# Patient Record
Sex: FEMALE | Race: WHITE | NOT HISPANIC OR LATINO | Employment: OTHER | URBAN - METROPOLITAN AREA
[De-identification: names, ages, dates, MRNs, and addresses within clinical notes are randomized per-mention and may not be internally consistent; named-entity substitution may affect disease eponyms.]

---

## 2018-10-14 ENCOUNTER — OFFICE VISIT (OUTPATIENT)
Dept: URGENT CARE | Facility: CLINIC | Age: 83
End: 2018-10-14
Payer: MEDICARE

## 2018-10-14 ENCOUNTER — HOSPITAL ENCOUNTER (OUTPATIENT)
Facility: MEDICAL CENTER | Age: 83
End: 2018-10-14
Attending: PHYSICIAN ASSISTANT
Payer: MEDICARE

## 2018-10-14 VITALS
BODY MASS INDEX: 21.98 KG/M2 | OXYGEN SATURATION: 92 % | RESPIRATION RATE: 16 BRPM | HEIGHT: 66 IN | SYSTOLIC BLOOD PRESSURE: 140 MMHG | TEMPERATURE: 60.8 F | HEART RATE: 92 BPM | DIASTOLIC BLOOD PRESSURE: 60 MMHG | WEIGHT: 136.8 LBS

## 2018-10-14 DIAGNOSIS — N30.90 CYSTITIS: ICD-10-CM

## 2018-10-14 LAB
APPEARANCE UR: NORMAL
BILIRUB UR STRIP-MCNC: NEGATIVE MG/DL
COLOR UR AUTO: YELLOW
GLUCOSE UR STRIP.AUTO-MCNC: NEGATIVE MG/DL
KETONES UR STRIP.AUTO-MCNC: NEGATIVE MG/DL
LEUKOCYTE ESTERASE UR QL STRIP.AUTO: NORMAL
NITRITE UR QL STRIP.AUTO: POSITIVE
PH UR STRIP.AUTO: 6.5 [PH] (ref 5–8)
PROT UR QL STRIP: NEGATIVE MG/DL
RBC UR QL AUTO: NEGATIVE
SP GR UR STRIP.AUTO: 1.01
UROBILINOGEN UR STRIP-MCNC: 0.2 MG/DL

## 2018-10-14 PROCEDURE — 99213 OFFICE O/P EST LOW 20 MIN: CPT | Performed by: PHYSICIAN ASSISTANT

## 2018-10-14 PROCEDURE — 87086 URINE CULTURE/COLONY COUNT: CPT

## 2018-10-14 PROCEDURE — 81002 URINALYSIS NONAUTO W/O SCOPE: CPT | Performed by: PHYSICIAN ASSISTANT

## 2018-10-14 RX ORDER — NITROFURANTOIN 25; 75 MG/1; MG/1
100 CAPSULE ORAL 2 TIMES DAILY
Qty: 14 CAP | Refills: 0 | Status: SHIPPED | OUTPATIENT
Start: 2018-10-14 | End: 2018-10-21

## 2018-10-14 ASSESSMENT — ENCOUNTER SYMPTOMS
FLANK PAIN: 0
CONSTITUTIONAL NEGATIVE: 1
ABDOMINAL PAIN: 1
ROS GI COMMENTS: +SUPRAPUBIC PRESS.
MUSCULOSKELETAL NEGATIVE: 1
BACK PAIN: 0

## 2018-10-14 NOTE — PROGRESS NOTES
Subjective:      Amirah Crane is a 83 y.o. female who presents with No chief complaint on file.            Dysuria    This is a new problem. The current episode started today. The problem occurs every urination. The problem has been unchanged. The quality of the pain is described as burning. The pain is moderate. There has been no fever. Associated symptoms include frequency and urgency. Pertinent negatives include no flank pain or hematuria. She has tried nothing for the symptoms. The treatment provided no relief. There is no history of recurrent UTIs.       Review of Systems   Constitutional: Negative.    Gastrointestinal: Positive for abdominal pain.        +suprapubic press.   Genitourinary: Positive for dysuria, frequency and urgency. Negative for flank pain and hematuria.   Musculoskeletal: Negative.  Negative for back pain.        No flank or CVAT    Skin: Negative.           Objective:     There were no vitals taken for this visit.     Physical Exam   Constitutional: She is oriented to person, place, and time. She appears well-developed and well-nourished. No distress.   Abdominal: Soft. She exhibits no distension. There is tenderness (+suprapubic press.).   No flank or CVAT     Neurological: She is alert and oriented to person, place, and time.   Skin: Skin is warm and dry.   Psychiatric: She has a normal mood and affect. Her behavior is normal.   Nursing note and vitals reviewed.    Active Ambulatory Problems     Diagnosis Date Noted   • No Active Ambulatory Problems     Resolved Ambulatory Problems     Diagnosis Date Noted   • No Resolved Ambulatory Problems     No Additional Past Medical History     Current Outpatient Prescriptions on File Prior to Visit   Medication Sig Dispense Refill   • metoprolol (LOPRESSOR) 25 MG Tab Take 25 mg by mouth 2 times a day.     • ciprofloxacin (CIPRO) 500 MG Tab Take 1 Tab by mouth 2 times a day. 20 Tab 0   • ciprofloxacin (CIPRO) 500 MG TABS Take 1 Tab by mouth 2  times a day. 20 Tab 0   • azithromycin (ZITHROMAX) 250 MG TABS Zpak; u.d. 1 Each 1   • celecoxib (CELEBREX) 200 MG CAPS Take 200 mg by mouth every day.     • conjugated estrogen (PREMARIN) 0.3 MG TABS Take 0.3 mg by mouth every day. DAYS 1-25        No current facility-administered medications on file prior to visit.      Social History     Social History   • Marital status: Single     Spouse name: N/A   • Number of children: N/A   • Years of education: N/A     Occupational History   • Not on file.     Social History Main Topics   • Smoking status: Not on file   • Smokeless tobacco: Not on file   • Alcohol use Not on file   • Drug use: Unknown   • Sexual activity: Not on file     Other Topics Concern   • Not on file     Social History Narrative   • No narrative on file     History reviewed. No pertinent family history.  Pcn [penicillins]         ua+     Assessment/Plan:     ·  uti      · rx abx; cx

## 2018-10-16 LAB
BACTERIA UR CULT: ABNORMAL
BACTERIA UR CULT: ABNORMAL
SIGNIFICANT IND 70042: ABNORMAL
SITE SITE: ABNORMAL
SOURCE SOURCE: ABNORMAL

## 2019-08-15 ENCOUNTER — HOSPITAL ENCOUNTER (OUTPATIENT)
Dept: RADIOLOGY | Facility: REHABILITATION | Age: 84
End: 2019-08-15
Attending: PHYSICAL MEDICINE & REHABILITATION

## 2019-08-15 ENCOUNTER — HOSPITAL ENCOUNTER (OUTPATIENT)
Dept: PAIN MANAGEMENT | Facility: REHABILITATION | Age: 84
End: 2019-08-15
Attending: PHYSICAL MEDICINE & REHABILITATION
Payer: MEDICARE

## 2019-08-15 VITALS
SYSTOLIC BLOOD PRESSURE: 142 MMHG | BODY MASS INDEX: 22.41 KG/M2 | TEMPERATURE: 97.9 F | DIASTOLIC BLOOD PRESSURE: 90 MMHG | WEIGHT: 134.48 LBS | HEIGHT: 65 IN | RESPIRATION RATE: 14 BRPM | HEART RATE: 64 BPM | OXYGEN SATURATION: 98 %

## 2019-08-15 PROCEDURE — 64640 INJECTION TREATMENT OF NERVE: CPT

## 2019-08-15 PROCEDURE — 700111 HCHG RX REV CODE 636 W/ 250 OVERRIDE (IP)

## 2019-08-15 PROCEDURE — 99152 MOD SED SAME PHYS/QHP 5/>YRS: CPT

## 2019-08-15 PROCEDURE — 700101 HCHG RX REV CODE 250

## 2019-08-15 RX ORDER — LIDOCAINE HYDROCHLORIDE 20 MG/ML
INJECTION, SOLUTION EPIDURAL; INFILTRATION; INTRACAUDAL; PERINEURAL
Status: COMPLETED
Start: 2019-08-15 | End: 2019-08-15

## 2019-08-15 RX ORDER — ESTRADIOL 0.5 MG/1
0.5 TABLET ORAL DAILY
COMMUNITY
End: 2021-10-29

## 2019-08-15 RX ORDER — ONDANSETRON 2 MG/ML
INJECTION INTRAMUSCULAR; INTRAVENOUS
Status: COMPLETED
Start: 2019-08-15 | End: 2019-08-15

## 2019-08-15 RX ORDER — MIDAZOLAM HYDROCHLORIDE 1 MG/ML
INJECTION INTRAMUSCULAR; INTRAVENOUS
Status: COMPLETED
Start: 2019-08-15 | End: 2019-08-15

## 2019-08-15 RX ORDER — LIDOCAINE HYDROCHLORIDE 10 MG/ML
INJECTION, SOLUTION EPIDURAL; INFILTRATION; INTRACAUDAL; PERINEURAL
Status: COMPLETED
Start: 2019-08-15 | End: 2019-08-15

## 2019-08-15 RX ADMIN — LIDOCAINE HYDROCHLORIDE 10 ML: 20 INJECTION, SOLUTION EPIDURAL; INFILTRATION; INTRACAUDAL; PERINEURAL at 13:00

## 2019-08-15 RX ADMIN — ONDANSETRON HYDROCHLORIDE 4 MG: 2 INJECTION, SOLUTION INTRAMUSCULAR; INTRAVENOUS at 13:10

## 2019-08-15 RX ADMIN — MIDAZOLAM HYDROCHLORIDE 0.5 MG: 1 INJECTION, SOLUTION INTRAMUSCULAR; INTRAVENOUS at 13:13

## 2019-08-15 RX ADMIN — FENTANYL CITRATE 25 MCG: 50 INJECTION, SOLUTION INTRAMUSCULAR; INTRAVENOUS at 13:13

## 2019-08-15 NOTE — NON-PROVIDER
Current meds. See medication reconciliation form. Reviewed with pt. Pt has been off meloxicam for 3 days Pt denies taking ASA,other blood thinners or anti-inflammatories. made aware pre-procedure. Pt has a ride post-procedure ([facility]tr[facility]ena is ). Printed and verbal discharge instructions given to pt who verbalized understanding.

## 2019-08-15 NOTE — NON-PROVIDER
Pt tolerated food and fluid post procedure without nausea or vomiting. Ambulated with standby assist of 1 without difficulty. Discharged into care of responsible adult.

## 2019-08-15 NOTE — PROCEDURES
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  Written informed consent was freely obtained. A pre-procedural time-out was conducted by the physician verifying patient’s identity, procedure to be performed, procedure site and side, and allergy verification. Appropriate equipment was determined to be in place for the procedure.     An IV was placed peripherally, and the patient received a low dose of IV Versed for anxiolysis and IV Fentanyl for analgesia and Zofran for Nausea. The patient's vital signs were carefully monitored before, throughout, and after the procedure.     In the fluoroscopy suite the patient was placed in a supine position, the knee was flexed with a pillow/towels underneath for support, and the skin was prepped and draped in the usual sterile fashion. The fluoroscope was placed over the  left knee at an true AP position, and THREE  targets for injection were marked. A 25g needle was placed into each of the markings at the genicular nerve targets, and approx 2cc of 1% Lidocaine was injected subcutaneously into the epidermal and dermal layers. The RF needle was then advanced into the superior lateral, superior medial, inferior medial target points for the genicular nerves. The needle tips were then verified by AP and lateral views.  Following negative aspiration, approx 1cc of 1% Lidocaine was then injected at the above levels. The RF probes were placed into each of the THREE needles, and  RF was applied to each of the nerves for 90 seconds at 80 degrees celsius. This was repeated.  The needles were removed intact after RF and 2 cc of 2% lidocaine was then  injected on the way out after multiple negative aspirations. . The patient's area was cleansed with sterile normal saline, and a dressing was applied.     The patient was then escorted to the recovery room and given post op instructions and will follow up in two to three weeks    There were no complications noted, was hemodynamically stable, and tolerated the procedure well.     Ben Kenyon MD  PM&R/Pain Mgmt

## 2021-01-14 DIAGNOSIS — Z23 NEED FOR VACCINATION: ICD-10-CM

## 2021-06-09 PROBLEM — M84.48XA SACRAL INSUFFICIENCY FRACTURE: Status: ACTIVE | Noted: 2021-06-09

## 2021-06-09 PROBLEM — M47.819 SPONDYLARTHRITIS: Status: ACTIVE | Noted: 2021-06-09

## 2021-06-09 PROBLEM — M54.16 LUMBAR RADICULOPATHY: Status: ACTIVE | Noted: 2021-06-09

## 2021-06-21 PROBLEM — R53.81 PHYSICAL DECONDITIONING: Status: ACTIVE | Noted: 2021-06-21

## 2021-06-21 PROBLEM — M81.0 OSTEOPOROSIS: Status: ACTIVE | Noted: 2021-06-21

## 2021-06-21 PROBLEM — F32.A DEPRESSION: Status: ACTIVE | Noted: 2021-06-21

## 2021-06-21 PROBLEM — Z87.440 HISTORY OF UTI: Status: ACTIVE | Noted: 2021-06-21

## 2021-06-21 PROBLEM — I47.19 PAT (PAROXYSMAL ATRIAL TACHYCARDIA) (HCC): Status: ACTIVE | Noted: 2021-06-21

## 2021-06-21 PROBLEM — R23.2 HOT FLASHES: Status: ACTIVE | Noted: 2021-06-21

## 2021-06-21 PROBLEM — R29.6 RECURRENT FALLS: Status: ACTIVE | Noted: 2021-06-21

## 2021-06-21 PROBLEM — R32 URINARY INCONTINENCE: Status: ACTIVE | Noted: 2021-06-21

## 2021-06-21 PROBLEM — G62.9 PERIPHERAL NEUROPATHY: Status: ACTIVE | Noted: 2021-06-21

## 2021-06-21 PROBLEM — R63.4 WEIGHT LOSS, UNINTENTIONAL: Status: ACTIVE | Noted: 2021-06-21

## 2021-07-23 PROBLEM — S72.22XD: Status: ACTIVE | Noted: 2021-07-23

## 2021-10-29 PROBLEM — R25.2 LEG CRAMPS: Status: ACTIVE | Noted: 2021-10-29

## 2021-10-29 PROBLEM — R68.2 DRY MOUTH: Status: ACTIVE | Noted: 2021-10-29

## 2021-10-29 PROBLEM — K59.00 CONSTIPATION: Status: ACTIVE | Noted: 2021-10-29

## 2022-01-25 PROBLEM — I47.10 SVT (SUPRAVENTRICULAR TACHYCARDIA) (HCC): Status: ACTIVE | Noted: 2022-01-21

## 2022-01-25 PROBLEM — R00.2 PALPITATION: Status: ACTIVE | Noted: 2022-01-21

## 2022-01-25 PROBLEM — K21.9 GERD (GASTROESOPHAGEAL REFLUX DISEASE): Status: ACTIVE | Noted: 2022-01-25

## 2022-02-11 ENCOUNTER — APPOINTMENT (OUTPATIENT)
Dept: RADIOLOGY | Facility: MEDICAL CENTER | Age: 87
End: 2022-02-11
Attending: EMERGENCY MEDICINE
Payer: MEDICARE

## 2022-02-11 ENCOUNTER — HOSPITAL ENCOUNTER (EMERGENCY)
Facility: MEDICAL CENTER | Age: 87
End: 2022-02-11
Attending: EMERGENCY MEDICINE
Payer: MEDICARE

## 2022-02-11 ENCOUNTER — OFFICE VISIT (OUTPATIENT)
Dept: URGENT CARE | Facility: CLINIC | Age: 87
End: 2022-02-11
Payer: MEDICARE

## 2022-02-11 VITALS
OXYGEN SATURATION: 98 % | SYSTOLIC BLOOD PRESSURE: 164 MMHG | RESPIRATION RATE: 16 BRPM | TEMPERATURE: 97.8 F | BODY MASS INDEX: 19.63 KG/M2 | HEART RATE: 68 BPM | HEIGHT: 64 IN | DIASTOLIC BLOOD PRESSURE: 81 MMHG | WEIGHT: 115 LBS

## 2022-02-11 VITALS
BODY MASS INDEX: 19.63 KG/M2 | SYSTOLIC BLOOD PRESSURE: 118 MMHG | TEMPERATURE: 98.1 F | RESPIRATION RATE: 14 BRPM | HEART RATE: 73 BPM | HEIGHT: 64 IN | OXYGEN SATURATION: 97 % | DIASTOLIC BLOOD PRESSURE: 84 MMHG | WEIGHT: 115 LBS

## 2022-02-11 DIAGNOSIS — R10.10 UPPER ABDOMINAL PAIN: ICD-10-CM

## 2022-02-11 DIAGNOSIS — R07.89 CHEST PRESSURE: ICD-10-CM

## 2022-02-11 DIAGNOSIS — R53.83 FATIGUE, UNSPECIFIED TYPE: ICD-10-CM

## 2022-02-11 DIAGNOSIS — K21.9 GASTROESOPHAGEAL REFLUX DISEASE, UNSPECIFIED WHETHER ESOPHAGITIS PRESENT: ICD-10-CM

## 2022-02-11 DIAGNOSIS — R10.13 EPIGASTRIC PAIN: Primary | ICD-10-CM

## 2022-02-11 DIAGNOSIS — R07.9 ACUTE CHEST PAIN: ICD-10-CM

## 2022-02-11 LAB
ALBUMIN SERPL BCP-MCNC: 4.1 G/DL (ref 3.2–4.9)
ALBUMIN/GLOB SERPL: 1.3 G/DL
ALP SERPL-CCNC: 122 U/L (ref 30–99)
ALT SERPL-CCNC: 19 U/L (ref 2–50)
ANION GAP SERPL CALC-SCNC: 13 MMOL/L (ref 7–16)
AST SERPL-CCNC: 25 U/L (ref 12–45)
BASOPHILS # BLD AUTO: 0.8 % (ref 0–1.8)
BASOPHILS # BLD: 0.08 K/UL (ref 0–0.12)
BILIRUB SERPL-MCNC: 0.5 MG/DL (ref 0.1–1.5)
BUN SERPL-MCNC: 15 MG/DL (ref 8–22)
CALCIUM SERPL-MCNC: 10.1 MG/DL (ref 8.5–10.5)
CHLORIDE SERPL-SCNC: 98 MMOL/L (ref 96–112)
CO2 SERPL-SCNC: 25 MMOL/L (ref 20–33)
CREAT SERPL-MCNC: 0.68 MG/DL (ref 0.5–1.4)
EKG IMPRESSION: NORMAL
EOSINOPHIL # BLD AUTO: 0.44 K/UL (ref 0–0.51)
EOSINOPHIL NFR BLD: 4.5 % (ref 0–6.9)
ERYTHROCYTE [DISTWIDTH] IN BLOOD BY AUTOMATED COUNT: 47.2 FL (ref 35.9–50)
GLOBULIN SER CALC-MCNC: 3.2 G/DL (ref 1.9–3.5)
GLUCOSE SERPL-MCNC: 99 MG/DL (ref 65–99)
HCT VFR BLD AUTO: 37.9 % (ref 37–47)
HGB BLD-MCNC: 12.4 G/DL (ref 12–16)
IMM GRANULOCYTES # BLD AUTO: 0.05 K/UL (ref 0–0.11)
IMM GRANULOCYTES NFR BLD AUTO: 0.5 % (ref 0–0.9)
LYMPHOCYTES # BLD AUTO: 1.53 K/UL (ref 1–4.8)
LYMPHOCYTES NFR BLD: 15.6 % (ref 22–41)
MCH RBC QN AUTO: 27.4 PG (ref 27–33)
MCHC RBC AUTO-ENTMCNC: 32.7 G/DL (ref 33.6–35)
MCV RBC AUTO: 83.7 FL (ref 81.4–97.8)
MONOCYTES # BLD AUTO: 0.99 K/UL (ref 0–0.85)
MONOCYTES NFR BLD AUTO: 10.1 % (ref 0–13.4)
NEUTROPHILS # BLD AUTO: 6.73 K/UL (ref 2–7.15)
NEUTROPHILS NFR BLD: 68.5 % (ref 44–72)
NRBC # BLD AUTO: 0 K/UL
NRBC BLD-RTO: 0 /100 WBC
NT-PROBNP SERPL IA-MCNC: 1224 PG/ML (ref 0–125)
PLATELET # BLD AUTO: 327 K/UL (ref 164–446)
PMV BLD AUTO: 9.6 FL (ref 9–12.9)
POTASSIUM SERPL-SCNC: 4.1 MMOL/L (ref 3.6–5.5)
PROT SERPL-MCNC: 7.3 G/DL (ref 6–8.2)
RBC # BLD AUTO: 4.53 M/UL (ref 4.2–5.4)
SODIUM SERPL-SCNC: 136 MMOL/L (ref 135–145)
TROPONIN T SERPL-MCNC: 12 NG/L (ref 6–19)
WBC # BLD AUTO: 9.8 K/UL (ref 4.8–10.8)

## 2022-02-11 PROCEDURE — 80053 COMPREHEN METABOLIC PANEL: CPT

## 2022-02-11 PROCEDURE — 84484 ASSAY OF TROPONIN QUANT: CPT

## 2022-02-11 PROCEDURE — 93000 ELECTROCARDIOGRAM COMPLETE: CPT | Performed by: PHYSICIAN ASSISTANT

## 2022-02-11 PROCEDURE — 99204 OFFICE O/P NEW MOD 45 MIN: CPT | Mod: 25 | Performed by: PHYSICIAN ASSISTANT

## 2022-02-11 PROCEDURE — 700117 HCHG RX CONTRAST REV CODE 255: Performed by: EMERGENCY MEDICINE

## 2022-02-11 PROCEDURE — 99285 EMERGENCY DEPT VISIT HI MDM: CPT

## 2022-02-11 PROCEDURE — 700102 HCHG RX REV CODE 250 W/ 637 OVERRIDE(OP): Performed by: EMERGENCY MEDICINE

## 2022-02-11 PROCEDURE — 93005 ELECTROCARDIOGRAM TRACING: CPT | Performed by: EMERGENCY MEDICINE

## 2022-02-11 PROCEDURE — 83880 ASSAY OF NATRIURETIC PEPTIDE: CPT

## 2022-02-11 PROCEDURE — 93005 ELECTROCARDIOGRAM TRACING: CPT

## 2022-02-11 PROCEDURE — 74177 CT ABD & PELVIS W/CONTRAST: CPT | Mod: ME

## 2022-02-11 PROCEDURE — A9270 NON-COVERED ITEM OR SERVICE: HCPCS | Performed by: EMERGENCY MEDICINE

## 2022-02-11 PROCEDURE — 85025 COMPLETE CBC W/AUTO DIFF WBC: CPT

## 2022-02-11 PROCEDURE — 71045 X-RAY EXAM CHEST 1 VIEW: CPT

## 2022-02-11 RX ORDER — M-VIT,TX,IRON,MINS/CALC/FOLIC 27MG-0.4MG
1 TABLET ORAL DAILY
Status: ON HOLD | COMMUNITY
End: 2022-06-20

## 2022-02-11 RX ORDER — SENNOSIDES 8.6 MG
650 CAPSULE ORAL EVERY 6 HOURS PRN
COMMUNITY

## 2022-02-11 RX ADMIN — IOHEXOL 80 ML: 350 INJECTION, SOLUTION INTRAVENOUS at 21:03

## 2022-02-11 RX ADMIN — LIDOCAINE HYDROCHLORIDE 30 ML: 20 SOLUTION OROPHARYNGEAL at 20:33

## 2022-02-11 ASSESSMENT — ENCOUNTER SYMPTOMS
BLURRED VISION: 0
FEVER: 0
SPUTUM PRODUCTION: 0
NAUSEA: 1
CONSTIPATION: 0
BACK PAIN: 1
DIARRHEA: 0
WEIGHT LOSS: 1
COUGH: 0
ABDOMINAL PAIN: 1
SHORTNESS OF BREATH: 0
CHILLS: 0
VOMITING: 1
BLOOD IN STOOL: 0
PALPITATIONS: 1
DIZZINESS: 0

## 2022-02-11 ASSESSMENT — FIBROSIS 4 INDEX
FIB4 SCORE: 1.61
FIB4 SCORE: 1.61

## 2022-02-12 NOTE — ED PROVIDER NOTES
ED Provider Note    Scribed for Braden José by Julienne Santos. 2/11/2022  7:47 PM    Primary care provider: KEVIN Nelson  Means of arrival: Walk in  History obtained from: Patient  History limited by: None    CHIEF COMPLAINT  Chief Complaint   Patient presents with   • Chest Pressure   • Abdominal Pain       HPI  Amirah Crane is a 86 y.o. female with a history of GERD who presents to the Emergency Department for evaluation of chest pressure and epigastric pain similar to prior episodes of GERD, onset one week ago. The patient was seen at Urgent Care earlier today for chest pain and was told to come to the [facility] ED for further evaluation. She describes the pain as a burning sensation in the epigastrium.  Nonexertional, nonradiating. She had two episodes of vomiting today, but has had intermittent vomiting for the last few weeks. The patient also had a fall a few days ago and experienced pain on her bilateral shoulders. She did not hit her head or experience any loss of consciousness during the fall. The patient has had a history of many bone fractures over the past two years and has been going to physical therapy to regain her strength. Uses a walker at baseline.  She experiences associated abdominal pain. She denies associated cough or diarrhea. She is vaccinated for COVID-19, but has not gotten her booster shot. The patient does not drink alcohol, do drugs, or smoke cigarettes.     Quality: burning   Duration: One week  Severity: Mild  Associated sx: Vomiting     REVIEW OF SYSTEMS  As above, all other systems reviewed and are negative.   See HPI for further details.     PAST MEDICAL HISTORY   None noted.     SURGICAL HISTORY  patient denies any surgical history  SOCIAL HISTORY  Social History     Tobacco Use   • Smoking status: Never Smoker   • Smokeless tobacco: Never Used   Substance Use Topics   • Alcohol use: None noted   • Drug use: None noted      Social History     Substance and Sexual  Activity   Drug Use None noted     FAMILY HISTORY  No family history on file.  CURRENT MEDICATIONS  Home Medications     Reviewed by Verito Rhodes (Pharmacy Tech) on 02/11/22 at 2045  Med List Status: Complete   Medication Last Dose Status   acetaminophen (ACETAMINOPHEN 8 HOUR) 650 MG CR tablet PRN Active   B Complex Vitamins (B COMPLEX PO) 2/10/2022 Active   Calcium Carbonate-Vitamin D (CALCIUM 600+D) 600-200 MG-UNIT Tab 2/10/2022 Active   celecoxib (CELEBREX) 200 MG Cap 2/11/2022 Active   fluoxetine (PROZAC) 40 MG capsule 2/11/2022 Active   magnesium oxide (MAG-OX) 400 MG Tab tablet 2/11/2022 Active   metoprolol SR (TOPROL XL) 25 MG TABLET SR 24 HR 2/11/2022 Active   nortriptyline (PAMELOR) 10 MG Cap PRN Active   omeprazole (PRILOSEC OTC) 20 MG tablet 2/11/2022 Active   therapeutic multivitamin-minerals (THERAGRAN-M) Tab 2/11/2022 Active   VITAMIN D PO 2/10/2022 Active              ALLERGIES  Allergies   Allergen Reactions   • Pcn [Penicillins] Rash     Full body        PHYSICAL EXAM    VITAL SIGNS:   Vitals:    02/11/22 2115 02/11/22 2155 02/11/22 2159 02/11/22 2209   BP: (!) 188/83 (!) 191/103 (!) 182/89 (!) 164/81   Pulse: 66 72 69 68   Resp: 16 16 16    Temp:  36.2 °C (97.2 °F)     TempSrc:  Temporal     SpO2: 95% 97% 98% 98%   Weight:       Height:           Vitals: My interpretation: hypotensive, not tachycardic, afebrile, not hypoxic    Reinterpretation of vitals: Unchanged    Cardiac Monitor Interpretation: The cardiac monitor revealed normal Sinus Rhythm as interpreted by me. The cardiac monitor was ordered secondary to the patient's history of chest pain, epigastric pain and to monitor for dysrhythmia and/or tachycardia.    PE:   Constitutional: Well developed, Well nourished, No acute distress, Non-toxic appearance.   HENT: Normocephalic, Atraumatic, Bilateral external ears normal, Oropharynx is clear mucous membranes are moist. No oral exudates or nasal discharge.   Eyes: Pupils are equal round  and reactive, EOMI, Conjunctiva normal, No discharge.   Neck: Normal range of motion, No tenderness, Supple, No stridor. No meningismus.  Lymphatic: No lymphadenopathy noted.   Cardiovascular: Regular rate and rhythm without murmur rub or gallop.  Thorax & Lungs: Clear breath sounds bilaterally without wheezes, rhonchi or rales. There is no chest wall tenderness.   Abdomen: Soft mild epigastric tenderness palpation without rebound or guarding, non-distended. There is no rebound or guarding. No organomegaly is appreciated. Bowel sounds are normal.  Skin: Normal without rash.   Back: No CVA or spinal tenderness.   Extremities: Intact distal pulses, No edema, No tenderness, No cyanosis, No clubbing. Capillary refill is less than 2 seconds.  Musculoskeletal: Good range of motion in all major joints. No tenderness to palpation or major deformities noted.   Neurologic: Alert & oriented x 3, Normal motor function, Normal sensory function, No focal deficits noted. Reflexes are normal.  Psychiatric: Affect normal, Judgment normal, Mood normal. There is no suicidal ideation or patient reported hallucinations.     DIAGNOSTIC STUDIES / PROCEDURES    LABS  Results for orders placed or performed during the hospital encounter of 02/11/22   CBC with Differential   Result Value Ref Range    WBC 9.8 4.8 - 10.8 K/uL    RBC 4.53 4.20 - 5.40 M/uL    Hemoglobin 12.4 12.0 - 16.0 g/dL    Hematocrit 37.9 37.0 - 47.0 %    MCV 83.7 81.4 - 97.8 fL    MCH 27.4 27.0 - 33.0 pg    MCHC 32.7 (L) 33.6 - 35.0 g/dL    RDW 47.2 35.9 - 50.0 fL    Platelet Count 327 164 - 446 K/uL    MPV 9.6 9.0 - 12.9 fL    Neutrophils-Polys 68.50 44.00 - 72.00 %    Lymphocytes 15.60 (L) 22.00 - 41.00 %    Monocytes 10.10 0.00 - 13.40 %    Eosinophils 4.50 0.00 - 6.90 %    Basophils 0.80 0.00 - 1.80 %    Immature Granulocytes 0.50 0.00 - 0.90 %    Nucleated RBC 0.00 /100 WBC    Neutrophils (Absolute) 6.73 2.00 - 7.15 K/uL    Lymphs (Absolute) 1.53 1.00 - 4.80 K/uL     Monos (Absolute) 0.99 (H) 0.00 - 0.85 K/uL    Eos (Absolute) 0.44 0.00 - 0.51 K/uL    Baso (Absolute) 0.08 0.00 - 0.12 K/uL    Immature Granulocytes (abs) 0.05 0.00 - 0.11 K/uL    NRBC (Absolute) 0.00 K/uL   Complete Metabolic Panel (CMP)   Result Value Ref Range    Sodium 136 135 - 145 mmol/L    Potassium 4.1 3.6 - 5.5 mmol/L    Chloride 98 96 - 112 mmol/L    Co2 25 20 - 33 mmol/L    Anion Gap 13.0 7.0 - 16.0    Glucose 99 65 - 99 mg/dL    Bun 15 8 - 22 mg/dL    Creatinine 0.68 0.50 - 1.40 mg/dL    Calcium 10.1 8.5 - 10.5 mg/dL    AST(SGOT) 25 12 - 45 U/L    ALT(SGPT) 19 2 - 50 U/L    Alkaline Phosphatase 122 (H) 30 - 99 U/L    Total Bilirubin 0.5 0.1 - 1.5 mg/dL    Albumin 4.1 3.2 - 4.9 g/dL    Total Protein 7.3 6.0 - 8.2 g/dL    Globulin 3.2 1.9 - 3.5 g/dL    A-G Ratio 1.3 g/dL   Troponin   Result Value Ref Range    Troponin T 12 6 - 19 ng/L   ESTIMATED GFR   Result Value Ref Range    GFR If African American >60 >60 mL/min/1.73 m 2    GFR If Non African American >60 >60 mL/min/1.73 m 2   proBrain Natriuretic Peptide, NT   Result Value Ref Range    NT-proBNP 1224 (H) 0 - 125 pg/mL   EKG (NOW)   Result Value Ref Range    Report       [facility] Emergency Dept.    Test Date:  2022  Pt Name:    NAMITA VALDOVINOS               Department: ER  MRN:        [id number]                      Room:  Gender:     Female                       Technician: [id number]  :        [id number]                   Requested By:ER TRIAGE PROTOCOL  Order #:    [id number]                    Harvey MD: Braden José    Measurements  Intervals                                Axis  Rate:       57                           P:          52  SD:         200                          QRS:        -39  QRSD:       120                          T:          21  QT:         484  QTc:        472    Interpretive Statements  SINUS RHYTHM  SUPRAVENTRICULAR BIGEMINY  IVCD, CONSIDER ATYPICAL RBBB  No previous ECG available for  comparison  Electronically Signed On 2- 19:52:29 PST by Braden José        All labs reviewed by me. Significant for Nolex ptosis, no anemia, normal electrolytes, normal renal function, normal liver enzymes, troponin negative    RADIOLOGY  CT-ABDOMEN-PELVIS WITH   Final Result         1.  Hepatomegaly   2.  Low-density hepatic lesions, most which are too small to definitively characterize, the largest of which has appearance most compatible with a cyst.   3.  Cholelithiasis   4.  Atherosclerosis and atherosclerotic coronary artery disease      DX-CHEST-PORTABLE (1 VIEW)   Final Result      No acute cardiopulmonary abnormality identified.        The radiologist's interpretation of all radiological studies have been reviewed by me.    COURSE & MEDICAL DECISION MAKING  Nursing notes, VS, PMSFHx, labs, imaging, EKG reviewed in chart.    Heart Score: Moderate    MDM: 7:47 PM Amirah Crane is a 86 y.o. female who presented with some mild acute on chronic epigastric pain that she related to urgent care as chest pain she sent here for chest pain work-up.  On evaluation she has no actual chest pain pain is all localized to her epigastrium.  Has a history of GERD with similar symptoms.  Describes it as burning sensation.  Vital signs show hypertension but otherwise unremarkable.  EKG without ischemic changes, troponin negative, labs include CBC, CMP are normal which is somewhat reassuring.  As she does have some mild epigastric discomfort, she was treated with GI cocktail with significant improvement in symptoms.  Considering her age and abdominal pain, CT abdomen was done which showed no acute abnormalities that explain patient's symptoms.  Discussed likelihood of this being a flareup of her acute on chronic GERD, will ask her to continue taking her proton pump inhibitor at home and follow-up with PCP.  She is ambulatory at baseline, has a daughter who lives with her who verbalized understanding return  precautions outpatient follow-up plan.    Patient has had high blood pressure while in the emergency department, felt likely secondary to medical condition. Counseled patient to monitor blood pressure at home and follow up with primary care physician.     The patient will return for new or worsening symptoms and is stable at the time of discharge.    The patient is referred to a primary physician for blood pressure management, diabetic screening, and for all other preventative health concerns.    DISPOSITION:  Patient will be discharged home in stable condition.    FOLLOW UP:  KEVIN Nelson  [address] [facility] [address]  [phone]      As needed, If symptoms worsen      OUTPATIENT MEDICATIONS:  New Prescriptions    No medications on file        FINAL IMPRESSION  1. Epigastric pain Acute   2. Gastroesophageal reflux disease, unspecified whether esophagitis present Acute   3. Acute chest pain Acute       Julienne GORDILLO (Ok), am scribing for, and in the presence of, Braden José.    Electronically signed by: Julienne Santos (Ok), 2/11/2022    IBraden personally performed the services described in this documentation, as scribed by Julienne Santos in my presence, and it is both accurate and complete. C.    The note accurately reflects work and decisions made by me.  Braden José  2/11/2022  8:33 PM

## 2022-02-12 NOTE — PROGRESS NOTES
Subjective:   Amirah Crane is a 86 y.o. female who presents for Abdominal Pain (1x week, upper abd pain, can not keep anything down, burning sensation, tightness upper back, vomitting )      HPI  86 y.o. female presents to urgent care with new problem to provider of worsening upper abdominal pain that radiates to back with mild chest tightness worsening since onset about 1 week ago. Recent increasing history of falls, patient ambulates with walker. She did fall hitting her left side about 1 week ago prior to the onset of her symptoms. Patient reports onset of vomiting yesterday after meals.  She reports decreased appetite.  Normal bowel movements with no constipation, diarrhea, or blood in stool.  No fevers.  No URI symptoms concerning for COVID-19. Denies other associated aggravating or alleviating factors.     Review of Systems   Constitutional: Positive for malaise/fatigue and weight loss. Negative for chills and fever.   Eyes: Negative for blurred vision.   Respiratory: Negative for cough, sputum production and shortness of breath.    Cardiovascular: Positive for chest pain and palpitations. Negative for leg swelling.   Gastrointestinal: Positive for abdominal pain, nausea and vomiting. Negative for blood in stool, constipation, diarrhea and melena.   Musculoskeletal: Positive for back pain.   Neurological: Negative for dizziness.       Patient Active Problem List   Diagnosis   • Spondylarthritis   • Lumbar radiculopathy   • Sacral insufficiency fracture   • Depression   • History of UTI   • Urinary incontinence   • Weight loss, unintentional   • Physical deconditioning   • Peripheral neuropathy   • Osteoporosis   • Recurrent falls   • Hot flashes   • Traumatic closed displaced subtrochanteric fracture of left femur with routine healing   • Leg cramps   • Constipation   • Dry mouth   • Palpitation   • SVT (supraventricular tachycardia) ([facility])   • GERD (gastroesophageal reflux disease)     History reviewed.  "No pertinent surgical history.  Social History     Tobacco Use   • Smoking status: Never Smoker   • Smokeless tobacco: Never Used   Substance Use Topics   • Alcohol use: Not on file   • Drug use: Not on file      History reviewed. No pertinent family history.   (Allergies, Medications, & Tobacco/Substance Use were reconciled by the Medical Assistant and reviewed by myself. The family history is prepopulated)     Objective:     /84 (BP Location: Left arm, Patient Position: Sitting, BP Cuff Size: Adult)   Pulse 73   Temp 36.7 °C (98.1 °F) (Temporal)   Resp 14   Ht 1.626 m (5' 4\")   Wt 52.2 kg (115 lb)   SpO2 97%   BMI 19.74 kg/m²     Physical Exam  Constitutional:       General: She is not in acute distress.     Appearance: She is ill-appearing.      Comments: frail   HENT:      Head: Normocephalic and atraumatic.      Mouth/Throat:      Mouth: Mucous membranes are dry.   Eyes:      Extraocular Movements: Extraocular movements intact.      Conjunctiva/sclera: Conjunctivae normal.   Cardiovascular:      Rate and Rhythm: Normal rate.      Pulses: Normal pulses.   Pulmonary:      Effort: Pulmonary effort is normal. No respiratory distress.      Breath sounds: Decreased breath sounds present.   Abdominal:      General: Bowel sounds are normal. There is no abdominal bruit.      Palpations: There is no shifting dullness or hepatomegaly.      Tenderness: There is generalized abdominal tenderness and tenderness in the epigastric area.      Comments: Minimal abdominal distension   Musculoskeletal:         General: Normal range of motion.      Cervical back: Normal range of motion and neck supple.   Skin:     General: Skin is warm and dry.      Coloration: Skin is not pale.   Neurological:      Mental Status: She is alert and oriented to person, place, and time.      Comments: Steady gait ambulating with walker   Psychiatric:         Mood and Affect: Mood normal.         Behavior: Behavior normal.         Thought " Content: Thought content normal.         Judgment: Judgment normal.         Assessment/Plan:     1. Upper abdominal pain  EKG - Clinic Performed   2. Abdominal distension     3. Fatigue, unspecified type       EKG: sinus rhythm. RBBB. No significant ST changes. Prolonged AK interval.   No old EKG for comparison.     Recommend further evaluation in emergency department. No acute findings on EKG. Patient symptoms seem consistent with epigastric pain, but she needs labs and higher level of medical care to make a definitive diagnosis. Patient and daughter at bedside agree with treatment plan and patient will be transports per private vehicle to emergency department of her choice.   Differential diagnosis, natural history, supportive care, and indications for immediate follow-up discussed.  Advised the patient to follow-up with the primary care physician for recheck, reevaluation, and consideration of further management.  Patient verbalized understanding of treatment plan and has no further questions regarding care.   I personally reviewed prior external notes and test results pertinent to today's visit.     Please note that this dictation was created using voice recognition software. I have made a reasonable attempt to correct obvious errors, but I expect that there are errors of grammar and possibly content that I did not discover before finalizing the note.    This note was electronically signed by Alysa Solares PA-C

## 2022-02-12 NOTE — ED NOTES
Med rec completed per patient and patient's daughter at bedside  Allergies reviewed  No PO Antibiotics in the last 30 days

## 2022-02-12 NOTE — ED NOTES
Patient discharged home per ERP.  Discharge teaching and education discussed with patient. POC discussed.   Patient verbalized understanding of discharge teaching and education. No other questions at this time.     PIV removed.     VSS. Patient alert and oriented. Patient's daughter here to drive patient home. Able to ambulate off unit safely with steady gait.

## 2022-02-12 NOTE — DISCHARGE INSTRUCTIONS
Please continue take your Prilosec proton pump inhibitors will help with your symptoms.  Follow-up with your PCP.  Today your work-up was fairly normal which is encouraging.  Your blood pressure was elevated and I want you to follow-up as well with your primary care physician regarding this.  Come back if any of your symptoms worsen.  Thank you for coming in today.

## 2022-02-12 NOTE — ED TRIAGE NOTES
Vitals:    02/11/22 1729   BP: (!) 195/82   Pulse: 71   Resp: 17   Temp: 36.2 °C (97.1 °F)   SpO2: 97%     Chief Complaint   Patient presents with   • Chest Pressure   • Abdominal Pain     Pt comes in from urgent care for chest pressure 3 out of 10 which is much worse with lying flat as well as abdominal pain that has lasted about a week. She states she is much more comfortable propped up on pillows. She gets SOB with exertion but not when she is still.  She has had a poor appetite and has been vomiting some today. Pt also suffered a GLF Tuesday where she hit her upper back/left side/shoulders, these are all sore still.     Pt wheeled to and from triage with her daughter. Pt is alert and oriented x 4.

## 2022-03-19 ENCOUNTER — APPOINTMENT (OUTPATIENT)
Dept: RADIOLOGY | Facility: MEDICAL CENTER | Age: 87
End: 2022-03-19
Attending: EMERGENCY MEDICINE
Payer: MEDICARE

## 2022-03-19 ENCOUNTER — HOSPITAL ENCOUNTER (EMERGENCY)
Facility: MEDICAL CENTER | Age: 87
End: 2022-03-19
Attending: EMERGENCY MEDICINE
Payer: MEDICARE

## 2022-03-19 VITALS
BODY MASS INDEX: 18.95 KG/M2 | TEMPERATURE: 97.9 F | DIASTOLIC BLOOD PRESSURE: 65 MMHG | HEART RATE: 62 BPM | RESPIRATION RATE: 21 BRPM | HEIGHT: 64 IN | WEIGHT: 111 LBS | OXYGEN SATURATION: 93 % | SYSTOLIC BLOOD PRESSURE: 153 MMHG

## 2022-03-19 DIAGNOSIS — R07.89 LEFT-SIDED CHEST WALL PAIN: ICD-10-CM
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
## 2022-03-19 DIAGNOSIS — M25.512 ACUTE PAIN OF LEFT SHOULDER: ICD-10-CM

## 2022-03-19 PROCEDURE — A9270 NON-COVERED ITEM OR SERVICE: HCPCS | Performed by: EMERGENCY MEDICINE

## 2022-03-19 PROCEDURE — 71045 X-RAY EXAM CHEST 1 VIEW: CPT

## 2022-03-19 PROCEDURE — 99284 EMERGENCY DEPT VISIT MOD MDM: CPT

## 2022-03-19 PROCEDURE — 700102 HCHG RX REV CODE 250 W/ 637 OVERRIDE(OP): Performed by: EMERGENCY MEDICINE

## 2022-03-19 PROCEDURE — 73060 X-RAY EXAM OF HUMERUS: CPT | Mod: LT

## 2022-03-19 RX ORDER — OXYCODONE HYDROCHLORIDE AND ACETAMINOPHEN 5; 325 MG/1; MG/1
1 TABLET ORAL ONCE
Status: COMPLETED | OUTPATIENT
Start: 2022-03-19 | End: 2022-03-19

## 2022-03-19 RX ADMIN — OXYCODONE HYDROCHLORIDE AND ACETAMINOPHEN 1 TABLET: 5; 325 TABLET ORAL at 04:10

## 2022-03-19 ASSESSMENT — FIBROSIS 4 INDEX: FIB4 SCORE: 1.51

## 2022-03-19 NOTE — ED PROVIDER NOTES
ED Provider Note    CHIEF COMPLAINT      HPI  Amirah Crane is a 86 y.o. female who presents after a ground-level fall.  Patient notes that she lost her balance coming back from the bathroom tonight and fell between the bed and the end table.  She notes she hit the lateral portion of her left upper arm on the end table as she fell.  She notes pain in the same area.  She additionally notes that 2 days ago she fell in a similar fashion and has some residual pain to the left lateral/inferior portion of her chest wall.  She has had no difficulty breathing and no hemoptysis.  She has had no fevers or chills and no shortness of breath or chest pain other than as noted above.  She did not hit her head and has no neck or spine pain that is new.  She notes chronic low back pain from scoliosis and a leg length discrepancy due to both to the scoliosis as well as a poorly healed hip fracture on the left.    REVIEW OF SYSTEMS  Constitutional: No recent fevers or chills  Skin: No rashes, abrasions, lacerations or bruising  HEENT: No facial pain, scalp pain, double vision, blurry vision, or mandible pain.  Neck: No neck pain, stiffness, or masses.  Chest: Left anterolateral/inferior chest wall pain.  Mild to moderate.  Pulm: No shortness of breath, pain with deep inspiration or expiration, or hemoptysis  Gastrointestinal: No abdominal pain, nausea, or distention.  No abrasions, lacerations, or bruising  Musculoskeletal: Pain to the left proximal humerus region.  No bruising, lacerations, skin tears, or abrasions.  Neurologic: No numbness, tingling, or focal motor weakness to affected extremity. No confusion or disorientation.  Heme: No bleeding or bruising problems.   Immuno: No hx of recurrent infections      PAST MEDICAL HISTORY   has a past medical history of GERD (gastroesophageal reflux disease).    SOCIAL HISTORY  Social History     Tobacco Use   • Smoking status: Never Smoker   • Smokeless tobacco: Never Used  "  Substance and Sexual Activity   • Alcohol use: Not on file   • Drug use: Not on file   • Sexual activity: Not on file       SURGICAL HISTORY  patient denies any surgical history    CURRENT MEDICATIONS  Home Medications    **Home medications have not yet been reviewed for this encounter**         ALLERGIES  Allergies   Allergen Reactions   • Pcn [Penicillins] Rash     Full body        PHYSICAL EXAM  VITAL SIGNS: BP (!) 173/74   Pulse 62   Temp 36.4 °C (97.6 °F) (Oral)   Resp (!) 21   Ht 1.626 m (5' 4\")   Wt 50.3 kg (111 lb)   SpO2 96%   BMI 19.05 kg/m²    Gen: Alert in no apparent distress.  HEENT: No signs of trauma, Bilateral external ears normal, Nose normal. Conjunctiva normal, Non-icteric.   Cardiovascular: Regular rate and rhythm.  Capillary refill less than 3 seconds to all extremities, 2+ distal pulses to all extremities.  Thorax & Lungs: Normal breath sounds, No respiratory distress, No wheezing bilateral chest rise.  Mild tenderness to palpation to the left anterolateral/inferior chest wall at the inferior costal margin.  There is no pain with AP/lateral compression of the chest wall.  No subcutaneous emphysema no crepitus, no step-offs, no deformities, no abrasions, no lacerations, no ecchymosis to affected area  Abdomen: Bowel sounds normal, Soft, No tenderness, No masses, No pulsatile masses. No Guarding or rebound  Skin: Warm, Dry.  No abrasions, lacerations, or ecchymosis noted  Back: No bony tenderness, No CVA tenderness.  No significant spinous process tenderness from base of occiput to sacrum.  No step-offs, no deformities, no ecchymosis, abrasions, or lacerations  Extremities: LUE: Active range of motion of the fingers, thumb, wrist, all appear normal in terms of range.  Patient is nondistressed and states it is nonpainful to do this.  She does have some pain with full extension of the elbow however she notes the pain is in the proximal portion of the humerus.  She has no elbow pain or " tenderness.  There are no tense muscle compartments, abrasions, ecchymosis, or lacerations noted  RUE: Passive range of motion of all joints from the shoulder to the fingers appear normal with no distress.  There are no tense muscle compartments, abrasions, ecchymosis, or lacerations   LLE:  There are no tense muscle compartments, abrasions, ecchymosis, or lacerations noted.  There is significant shortening of several centimeters noted to the left lower extremity.  This is chronic per patient  RLE: There are no tense muscle compartments, abrasions, ecchymosis, or lacerations noted  Neurologic: Alert , no facial droop, grossly normal coordination and strength  Psychiatric: Affect normal, Judgment normal, Mood normal.       LABS  Results for orders placed or performed during the hospital encounter of 02/11/22   CBC with Differential   Result Value Ref Range    WBC 9.8 4.8 - 10.8 K/uL    RBC 4.53 4.20 - 5.40 M/uL    Hemoglobin 12.4 12.0 - 16.0 g/dL    Hematocrit 37.9 37.0 - 47.0 %    MCV 83.7 81.4 - 97.8 fL    MCH 27.4 27.0 - 33.0 pg    MCHC 32.7 (L) 33.6 - 35.0 g/dL    RDW 47.2 35.9 - 50.0 fL    Platelet Count 327 164 - 446 K/uL    MPV 9.6 9.0 - 12.9 fL    Neutrophils-Polys 68.50 44.00 - 72.00 %    Lymphocytes 15.60 (L) 22.00 - 41.00 %    Monocytes 10.10 0.00 - 13.40 %    Eosinophils 4.50 0.00 - 6.90 %    Basophils 0.80 0.00 - 1.80 %    Immature Granulocytes 0.50 0.00 - 0.90 %    Nucleated RBC 0.00 /100 WBC    Neutrophils (Absolute) 6.73 2.00 - 7.15 K/uL    Lymphs (Absolute) 1.53 1.00 - 4.80 K/uL    Monos (Absolute) 0.99 (H) 0.00 - 0.85 K/uL    Eos (Absolute) 0.44 0.00 - 0.51 K/uL    Baso (Absolute) 0.08 0.00 - 0.12 K/uL    Immature Granulocytes (abs) 0.05 0.00 - 0.11 K/uL    NRBC (Absolute) 0.00 K/uL   Complete Metabolic Panel (CMP)   Result Value Ref Range    Sodium 136 135 - 145 mmol/L    Potassium 4.1 3.6 - 5.5 mmol/L    Chloride 98 96 - 112 mmol/L    Co2 25 20 - 33 mmol/L    Anion Gap 13.0 7.0 - 16.0    Glucose  99 65 - 99 mg/dL    Bun 15 8 - 22 mg/dL    Creatinine 0.68 0.50 - 1.40 mg/dL    Calcium 10.1 8.5 - 10.5 mg/dL    AST(SGOT) 25 12 - 45 U/L    ALT(SGPT) 19 2 - 50 U/L    Alkaline Phosphatase 122 (H) 30 - 99 U/L    Total Bilirubin 0.5 0.1 - 1.5 mg/dL    Albumin 4.1 3.2 - 4.9 g/dL    Total Protein 7.3 6.0 - 8.2 g/dL    Globulin 3.2 1.9 - 3.5 g/dL    A-G Ratio 1.3 g/dL   Troponin   Result Value Ref Range    Troponin T 12 6 - 19 ng/L   ESTIMATED GFR   Result Value Ref Range    GFR If African American >60 >60 mL/min/1.73 m 2    GFR If Non African American >60 >60 mL/min/1.73 m 2   proBrain Natriuretic Peptide, NT   Result Value Ref Range    NT-proBNP 1224 (H) 0 - 125 pg/mL   EKG (NOW)   Result Value Ref Range    Report       [facility] Emergency Dept.    Test Date:  2022  Pt Name:    NAMITA VALDOVINOS               Department: ER  MRN:        [id number]                      Room:  Gender:     Female                       Technician: [id number]  :        [id number]                   Requested By:ER TRIAGE PROTOCOL  Order #:    [id number]                    Reading MD: Braden José    Measurements  Intervals                                Axis  Rate:       57                           P:          52  CT:         200                          QRS:        -39  QRSD:       120                          T:          21  QT:         484  QTc:        472    Interpretive Statements  SINUS RHYTHM  SUPRAVENTRICULAR BIGEMINY  IVCD, CONSIDER ATYPICAL RBBB  No previous ECG available for comparison  Electronically Signed On 2022 19:52:29 PST by Braden José         RADIOLOGY  DX-CHEST-PORTABLE (1 VIEW)   Final Result         1. No acute cardiopulmonary abnormalities are identified.      DX-HUMERUS 2+ LEFT   Final Result            No definite fracture is seen but evaluation is limited due to osseous demineralization.      Elevation of the humeral head abutting the acromion could relate to chronic rotator cuff  tear.          COURSE & MEDICAL DECISION MAKING  Patient arrives for evaluation after ground-level fall she is suspicious for proximal humerus fracture.  Will obtain screening plain films of the humerus and of the chest as she also notes some mild/moderate pain the left side of her inferior chest wall.  There are no other findings to suggest any significant injuries.  Is notable that the patient is somewhat debilitated from recent injuries and is prone to falling.  Her daughter who accompanies her is concerned about this but also notes that there are following up with her PCP to determine whether home health and PT OT is appropriate.  They do not wish to stay in the emergency department to get this consultation however.    3:50 AM  Patient has no plain film imaging to suggest a significant injury and has not had any interval deterioration.  I once again discussed the possibility of obtaining social work and case management consultation in the emergency department however the both felt that their primary care provider could adequately cover this as an outpatient.  The patient wants to be discharged home and her daughter is there to help her.  The patient's recent falls are concerning and undoubtably she is at risk for more falls.  Regardless, there are no criteria for inpatient hospitalization or observation and I feel she is safe for discharge with symptomatic treatment.  She will be given a sling and asked to follow-up with orthopedic surgery if her symptoms persist.  She will return if they worsen or change in any way    FINAL IMPRESSION  1. Acute pain of left shoulder    2. Left-sided chest wall pain    3. Fall, initial encounter        Electronically signed by: Aldo Snell M.D., 3/19/2022 2:11 AM

## 2022-03-19 NOTE — ED NOTES
"Pt discharged home. Pt in possession of belongings. Pt provided discharge education and information pertaining to medications and follow up appointments. Pt received copy of discharge instructions and verbalized understanding. /65   Pulse 62   Temp 36.6 °C (97.9 °F) (Temporal)   Resp (!) 21   Ht 1.626 m (5' 4\")   Wt 50.3 kg (111 lb)   SpO2 93%   BMI 19.05 kg/m²   "

## 2022-03-19 NOTE — ED NOTES
Pt reports she got up around 1200 to use the restroom and had mechanical GLF. Pt denies hitting her head, -thinner, -LOC. C/o L shoulder pain, pt in sling from EMS. Pt is AAOx4. Resp even and unlabored.  Pt also c/o L rib pain for a few days, SOB with pain.     Pt reports she ambulates with walker at home

## 2022-04-11 ENCOUNTER — HOSPITAL ENCOUNTER (OUTPATIENT)
Dept: LAB | Facility: MEDICAL CENTER | Age: 87
End: 2022-04-11
Payer: MEDICARE

## 2022-04-11 PROCEDURE — 36415 COLL VENOUS BLD VENIPUNCTURE: CPT

## 2022-04-11 PROCEDURE — 86480 TB TEST CELL IMMUN MEASURE: CPT

## 2022-04-12 PROBLEM — F41.9 ANXIETY: Status: ACTIVE | Noted: 2022-04-12

## 2022-04-12 LAB
GAMMA INTERFERON BACKGROUND BLD IA-ACNC: 0.04 IU/ML
M TB IFN-G BLD-IMP: NEGATIVE
M TB IFN-G CD4+ BCKGRND COR BLD-ACNC: 0 IU/ML
MITOGEN IGNF BCKGRD COR BLD-ACNC: >10 IU/ML
QFT TB2 - NIL TBQ2: 0 IU/ML

## 2022-05-19 ENCOUNTER — APPOINTMENT (OUTPATIENT)
Dept: RADIOLOGY | Facility: MEDICAL CENTER | Age: 87
End: 2022-05-19
Attending: NURSE PRACTITIONER
Payer: MEDICARE

## 2022-05-20 ENCOUNTER — HOSPITAL ENCOUNTER (OUTPATIENT)
Facility: MEDICAL CENTER | Age: 87
End: 2022-05-20
Attending: PHYSICIAN ASSISTANT
Payer: MEDICARE

## 2022-05-20 LAB
APPEARANCE UR: ABNORMAL
BACTERIA #/AREA URNS HPF: ABNORMAL /HPF
BILIRUB UR QL STRIP.AUTO: NEGATIVE
COLOR UR: YELLOW
EPI CELLS #/AREA URNS HPF: NEGATIVE /HPF
GLUCOSE UR STRIP.AUTO-MCNC: NEGATIVE MG/DL
HYALINE CASTS #/AREA URNS LPF: ABNORMAL /LPF
KETONES UR STRIP.AUTO-MCNC: NEGATIVE MG/DL
LEUKOCYTE ESTERASE UR QL STRIP.AUTO: NEGATIVE
MICRO URNS: ABNORMAL
NITRITE UR QL STRIP.AUTO: POSITIVE
PH UR STRIP.AUTO: 7.5 [PH] (ref 5–8)
PROT UR QL STRIP: NEGATIVE MG/DL
RBC # URNS HPF: ABNORMAL /HPF
RBC UR QL AUTO: NEGATIVE
SP GR UR STRIP.AUTO: 1.02
UROBILINOGEN UR STRIP.AUTO-MCNC: 0.2 MG/DL
WBC #/AREA URNS HPF: ABNORMAL /HPF

## 2022-05-20 PROCEDURE — 81001 URINALYSIS AUTO W/SCOPE: CPT

## 2022-06-20 ENCOUNTER — APPOINTMENT (OUTPATIENT)
Dept: RADIOLOGY | Facility: MEDICAL CENTER | Age: 87
DRG: 481 | End: 2022-06-20
Attending: EMERGENCY MEDICINE
Payer: MEDICARE

## 2022-06-20 ENCOUNTER — APPOINTMENT (OUTPATIENT)
Dept: RADIOLOGY | Facility: MEDICAL CENTER | Age: 87
DRG: 481 | End: 2022-06-20
Attending: ORTHOPAEDIC SURGERY
Payer: MEDICARE

## 2022-06-20 ENCOUNTER — HOSPITAL ENCOUNTER (INPATIENT)
Facility: MEDICAL CENTER | Age: 87
LOS: 3 days | DRG: 481 | End: 2022-06-23
Attending: EMERGENCY MEDICINE | Admitting: STUDENT IN AN ORGANIZED HEALTH CARE EDUCATION/TRAINING PROGRAM
Payer: MEDICARE

## 2022-06-20 ENCOUNTER — ANESTHESIA (OUTPATIENT)
Dept: SURGERY | Facility: MEDICAL CENTER | Age: 87
DRG: 481 | End: 2022-06-20
Payer: MEDICARE

## 2022-06-20 ENCOUNTER — ANESTHESIA EVENT (OUTPATIENT)
Dept: SURGERY | Facility: MEDICAL CENTER | Age: 87
DRG: 481 | End: 2022-06-20
Payer: MEDICARE

## 2022-06-20 DIAGNOSIS — R53.81 PHYSICAL DECONDITIONING: ICD-10-CM

## 2022-06-20 DIAGNOSIS — S72.141A CLOSED INTERTROCHANTERIC FRACTURE OF RIGHT HIP, INITIAL ENCOUNTER (HCC): ICD-10-CM

## 2022-06-20 DIAGNOSIS — S01.01XA SCALP LACERATION, INITIAL ENCOUNTER: ICD-10-CM

## 2022-06-20 DIAGNOSIS — S09.90XA CLOSED HEAD INJURY, INITIAL ENCOUNTER: ICD-10-CM

## 2022-06-20 DIAGNOSIS — S72.001A CLOSED RIGHT HIP FRACTURE, INITIAL ENCOUNTER (HCC): ICD-10-CM
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
## 2022-06-20 PROBLEM — Z71.89 ADVANCED CARE PLANNING/COUNSELING DISCUSSION: Status: ACTIVE | Noted: 2022-06-20

## 2022-06-20 LAB
ANION GAP SERPL CALC-SCNC: 11 MMOL/L (ref 7–16)
BASOPHILS # BLD AUTO: 0.2 % (ref 0–1.8)
BASOPHILS # BLD: 0.02 K/UL (ref 0–0.12)
BUN SERPL-MCNC: 26 MG/DL (ref 8–22)
CALCIUM SERPL-MCNC: 8.9 MG/DL (ref 8.5–10.5)
CHLORIDE SERPL-SCNC: 102 MMOL/L (ref 96–112)
CO2 SERPL-SCNC: 22 MMOL/L (ref 20–33)
CREAT SERPL-MCNC: 0.75 MG/DL (ref 0.5–1.4)
EOSINOPHIL # BLD AUTO: 0.12 K/UL (ref 0–0.51)
EOSINOPHIL NFR BLD: 1.3 % (ref 0–6.9)
ERYTHROCYTE [DISTWIDTH] IN BLOOD BY AUTOMATED COUNT: 57.4 FL (ref 35.9–50)
GFR SERPLBLD CREATININE-BSD FMLA CKD-EPI: 77 ML/MIN/1.73 M 2
GLUCOSE SERPL-MCNC: 103 MG/DL (ref 65–99)
HCT VFR BLD AUTO: 35.2 % (ref 37–47)
HGB BLD-MCNC: 11.4 G/DL (ref 12–16)
IMM GRANULOCYTES # BLD AUTO: 0.05 K/UL (ref 0–0.11)
IMM GRANULOCYTES NFR BLD AUTO: 0.6 % (ref 0–0.9)
INR PPP: 1.02 (ref 0.87–1.13)
LYMPHOCYTES # BLD AUTO: 2.83 K/UL (ref 1–4.8)
LYMPHOCYTES NFR BLD: 31.4 % (ref 22–41)
MCH RBC QN AUTO: 27.7 PG (ref 27–33)
MCHC RBC AUTO-ENTMCNC: 32.4 G/DL (ref 33.6–35)
MCV RBC AUTO: 85.4 FL (ref 81.4–97.8)
MONOCYTES # BLD AUTO: 0.8 K/UL (ref 0–0.85)
MONOCYTES NFR BLD AUTO: 8.9 % (ref 0–13.4)
NEUTROPHILS # BLD AUTO: 5.18 K/UL (ref 2–7.15)
NEUTROPHILS NFR BLD: 57.6 % (ref 44–72)
NRBC # BLD AUTO: 0 K/UL
NRBC BLD-RTO: 0 /100 WBC
PLATELET # BLD AUTO: 283 K/UL (ref 164–446)
PMV BLD AUTO: 10.3 FL (ref 9–12.9)
POTASSIUM SERPL-SCNC: 4.3 MMOL/L (ref 3.6–5.5)
PROTHROMBIN TIME: 13.1 SEC (ref 12–14.6)
RBC # BLD AUTO: 4.12 M/UL (ref 4.2–5.4)
SODIUM SERPL-SCNC: 135 MMOL/L (ref 135–145)
WBC # BLD AUTO: 9 K/UL (ref 4.8–10.8)

## 2022-06-20 PROCEDURE — 304999 HCHG REPAIR-SIMPLE/INTERMED LEVEL 1

## 2022-06-20 PROCEDURE — 99100 ANES PT EXTEME AGE<1 YR&>70: CPT | Performed by: ANESTHESIOLOGY

## 2022-06-20 PROCEDURE — 73502 X-RAY EXAM HIP UNI 2-3 VIEWS: CPT | Mod: RT

## 2022-06-20 PROCEDURE — 502000 HCHG MISC OR IMPLANTS RC 0278: Performed by: ORTHOPAEDIC SURGERY

## 2022-06-20 PROCEDURE — 72170 X-RAY EXAM OF PELVIS: CPT

## 2022-06-20 PROCEDURE — 27245 TREAT THIGH FRACTURE: CPT | Mod: RT | Performed by: ORTHOPAEDIC SURGERY

## 2022-06-20 PROCEDURE — 85025 COMPLETE CBC W/AUTO DIFF WBC: CPT

## 2022-06-20 PROCEDURE — 160048 HCHG OR STATISTICAL LEVEL 1-5: Performed by: ORTHOPAEDIC SURGERY

## 2022-06-20 PROCEDURE — 99221 1ST HOSP IP/OBS SF/LOW 40: CPT | Mod: 57 | Performed by: STUDENT IN AN ORGANIZED HEALTH CARE EDUCATION/TRAINING PROGRAM

## 2022-06-20 PROCEDURE — 99223 1ST HOSP IP/OBS HIGH 75: CPT | Mod: AI | Performed by: STUDENT IN AN ORGANIZED HEALTH CARE EDUCATION/TRAINING PROGRAM

## 2022-06-20 PROCEDURE — 96376 TX/PRO/DX INJ SAME DRUG ADON: CPT

## 2022-06-20 PROCEDURE — 0QS636Z REPOSITION RIGHT UPPER FEMUR WITH INTRAMEDULLARY INTERNAL FIXATION DEVICE, PERCUTANEOUS APPROACH: ICD-10-PCS | Performed by: ORTHOPAEDIC SURGERY

## 2022-06-20 PROCEDURE — 700111 HCHG RX REV CODE 636 W/ 250 OVERRIDE (IP): Performed by: ANESTHESIOLOGY

## 2022-06-20 PROCEDURE — 770001 HCHG ROOM/CARE - MED/SURG/GYN PRIV*

## 2022-06-20 PROCEDURE — 96374 THER/PROPH/DIAG INJ IV PUSH: CPT

## 2022-06-20 PROCEDURE — 36569 INSJ PICC 5 YR+ W/O IMAGING: CPT | Performed by: ANESTHESIOLOGY

## 2022-06-20 PROCEDURE — 72125 CT NECK SPINE W/O DYE: CPT | Mod: MF

## 2022-06-20 PROCEDURE — 160035 HCHG PACU - 1ST 60 MINS PHASE I: Performed by: ORTHOPAEDIC SURGERY

## 2022-06-20 PROCEDURE — 305308 HCHG STAPLER,SKIN,DISP.

## 2022-06-20 PROCEDURE — 700105 HCHG RX REV CODE 258: Performed by: STUDENT IN AN ORGANIZED HEALTH CARE EDUCATION/TRAINING PROGRAM

## 2022-06-20 PROCEDURE — 99291 CRITICAL CARE FIRST HOUR: CPT

## 2022-06-20 PROCEDURE — 82728 ASSAY OF FERRITIN: CPT

## 2022-06-20 PROCEDURE — 73552 X-RAY EXAM OF FEMUR 2/>: CPT | Mod: RT

## 2022-06-20 PROCEDURE — 0HQ0XZZ REPAIR SCALP SKIN, EXTERNAL APPROACH: ICD-10-PCS | Performed by: EMERGENCY MEDICINE

## 2022-06-20 PROCEDURE — 80048 BASIC METABOLIC PNL TOTAL CA: CPT

## 2022-06-20 PROCEDURE — 160002 HCHG RECOVERY MINUTES (STAT): Performed by: ORTHOPAEDIC SURGERY

## 2022-06-20 PROCEDURE — 01230 ANES OPN UPPER 2/3 FEMUR NOS: CPT | Performed by: ANESTHESIOLOGY

## 2022-06-20 PROCEDURE — 160029 HCHG SURGERY MINUTES - 1ST 30 MINS LEVEL 4: Performed by: ORTHOPAEDIC SURGERY

## 2022-06-20 PROCEDURE — 3E0234Z INTRODUCTION OF SERUM, TOXOID AND VACCINE INTO MUSCLE, PERCUTANEOUS APPROACH: ICD-10-PCS | Performed by: EMERGENCY MEDICINE

## 2022-06-20 PROCEDURE — 90471 IMMUNIZATION ADMIN: CPT

## 2022-06-20 PROCEDURE — 36415 COLL VENOUS BLD VENIPUNCTURE: CPT

## 2022-06-20 PROCEDURE — 73700 CT LOWER EXTREMITY W/O DYE: CPT | Mod: RT,MG

## 2022-06-20 PROCEDURE — 700105 HCHG RX REV CODE 258: Performed by: ANESTHESIOLOGY

## 2022-06-20 PROCEDURE — 160009 HCHG ANES TIME/MIN: Performed by: ORTHOPAEDIC SURGERY

## 2022-06-20 PROCEDURE — 85610 PROTHROMBIN TIME: CPT

## 2022-06-20 PROCEDURE — 304217 HCHG IRRIGATION SYSTEM

## 2022-06-20 PROCEDURE — 70450 CT HEAD/BRAIN W/O DYE: CPT | Mod: MG

## 2022-06-20 PROCEDURE — 700111 HCHG RX REV CODE 636 W/ 250 OVERRIDE (IP): Performed by: STUDENT IN AN ORGANIZED HEALTH CARE EDUCATION/TRAINING PROGRAM

## 2022-06-20 PROCEDURE — 160036 HCHG PACU - EA ADDL 30 MINS PHASE I: Performed by: ORTHOPAEDIC SURGERY

## 2022-06-20 PROCEDURE — 160041 HCHG SURGERY MINUTES - EA ADDL 1 MIN LEVEL 4: Performed by: ORTHOPAEDIC SURGERY

## 2022-06-20 PROCEDURE — 96375 TX/PRO/DX INJ NEW DRUG ADDON: CPT

## 2022-06-20 PROCEDURE — 700111 HCHG RX REV CODE 636 W/ 250 OVERRIDE (IP): Performed by: EMERGENCY MEDICINE

## 2022-06-20 PROCEDURE — 84443 ASSAY THYROID STIM HORMONE: CPT

## 2022-06-20 PROCEDURE — C1713 ANCHOR/SCREW BN/BN,TIS/BN: HCPCS | Performed by: ORTHOPAEDIC SURGERY

## 2022-06-20 PROCEDURE — 71045 X-RAY EXAM CHEST 1 VIEW: CPT

## 2022-06-20 PROCEDURE — 90715 TDAP VACCINE 7 YRS/> IM: CPT | Performed by: EMERGENCY MEDICINE

## 2022-06-20 DEVICE — SCREW CROSS LOCK 5MM X 32.5MM (4TX5=20): Type: IMPLANTABLE DEVICE | Site: HIP | Status: FUNCTIONAL

## 2022-06-20 DEVICE — NAIL HIP 127 DEGREE 10MM X 210MM (4TX1=4): Type: IMPLANTABLE DEVICE | Site: HIP | Status: FUNCTIONAL

## 2022-06-20 DEVICE — SCREW LAG 10.5MM X 95MM (4TX2=8): Type: IMPLANTABLE DEVICE | Site: HIP | Status: FUNCTIONAL

## 2022-06-20 RX ORDER — ONDANSETRON 2 MG/ML
INJECTION INTRAMUSCULAR; INTRAVENOUS PRN
Status: DISCONTINUED | OUTPATIENT
Start: 2022-06-20 | End: 2022-06-20 | Stop reason: SURG

## 2022-06-20 RX ORDER — MEPERIDINE HYDROCHLORIDE 25 MG/ML
12.5 INJECTION INTRAMUSCULAR; INTRAVENOUS; SUBCUTANEOUS
Status: DISCONTINUED | OUTPATIENT
Start: 2022-06-20 | End: 2022-06-21 | Stop reason: HOSPADM

## 2022-06-20 RX ORDER — CEFAZOLIN SODIUM 1 G/3ML
INJECTION, POWDER, FOR SOLUTION INTRAMUSCULAR; INTRAVENOUS PRN
Status: DISCONTINUED | OUTPATIENT
Start: 2022-06-20 | End: 2022-06-20 | Stop reason: SURG

## 2022-06-20 RX ORDER — FLUOXETINE HYDROCHLORIDE 20 MG/1
40 CAPSULE ORAL DAILY
Status: DISCONTINUED | OUTPATIENT
Start: 2022-06-20 | End: 2022-06-23 | Stop reason: HOSPADM

## 2022-06-20 RX ORDER — METOPROLOL SUCCINATE 25 MG/1
37.5 TABLET, EXTENDED RELEASE ORAL DAILY
Status: DISCONTINUED | OUTPATIENT
Start: 2022-06-20 | End: 2022-06-23 | Stop reason: HOSPADM

## 2022-06-20 RX ORDER — HYDROMORPHONE HYDROCHLORIDE 1 MG/ML
0.4 INJECTION, SOLUTION INTRAMUSCULAR; INTRAVENOUS; SUBCUTANEOUS
Status: DISCONTINUED | OUTPATIENT
Start: 2022-06-20 | End: 2022-06-21 | Stop reason: HOSPADM

## 2022-06-20 RX ORDER — HYDRALAZINE HYDROCHLORIDE 20 MG/ML
5 INJECTION INTRAMUSCULAR; INTRAVENOUS
Status: DISCONTINUED | OUTPATIENT
Start: 2022-06-20 | End: 2022-06-21 | Stop reason: HOSPADM

## 2022-06-20 RX ORDER — ENOXAPARIN SODIUM 100 MG/ML
40 INJECTION SUBCUTANEOUS DAILY
Status: DISCONTINUED | OUTPATIENT
Start: 2022-06-20 | End: 2022-06-21

## 2022-06-20 RX ORDER — OXYCODONE HCL 5 MG/5 ML
10 SOLUTION, ORAL ORAL
Status: DISCONTINUED | OUTPATIENT
Start: 2022-06-20 | End: 2022-06-21 | Stop reason: HOSPADM

## 2022-06-20 RX ORDER — METOCLOPRAMIDE HYDROCHLORIDE 5 MG/ML
INJECTION INTRAMUSCULAR; INTRAVENOUS PRN
Status: DISCONTINUED | OUTPATIENT
Start: 2022-06-20 | End: 2022-06-20 | Stop reason: SURG

## 2022-06-20 RX ORDER — SODIUM CHLORIDE, SODIUM LACTATE, POTASSIUM CHLORIDE, CALCIUM CHLORIDE 600; 310; 30; 20 MG/100ML; MG/100ML; MG/100ML; MG/100ML
INJECTION, SOLUTION INTRAVENOUS
Status: DISCONTINUED | OUTPATIENT
Start: 2022-06-20 | End: 2022-06-20 | Stop reason: SURG

## 2022-06-20 RX ORDER — HYDROMORPHONE HYDROCHLORIDE 1 MG/ML
0.2 INJECTION, SOLUTION INTRAMUSCULAR; INTRAVENOUS; SUBCUTANEOUS
Status: DISCONTINUED | OUTPATIENT
Start: 2022-06-20 | End: 2022-06-21 | Stop reason: HOSPADM

## 2022-06-20 RX ORDER — MORPHINE SULFATE 4 MG/ML
1 INJECTION INTRAVENOUS EVERY 4 HOURS PRN
Status: DISCONTINUED | OUTPATIENT
Start: 2022-06-20 | End: 2022-06-23 | Stop reason: HOSPADM

## 2022-06-20 RX ORDER — MORPHINE SULFATE 4 MG/ML
4 INJECTION INTRAVENOUS ONCE
Status: COMPLETED | OUTPATIENT
Start: 2022-06-20 | End: 2022-06-20

## 2022-06-20 RX ORDER — ONDANSETRON 2 MG/ML
4 INJECTION INTRAMUSCULAR; INTRAVENOUS
Status: DISCONTINUED | OUTPATIENT
Start: 2022-06-20 | End: 2022-06-21 | Stop reason: HOSPADM

## 2022-06-20 RX ORDER — OXYCODONE HCL 5 MG/5 ML
5 SOLUTION, ORAL ORAL
Status: DISCONTINUED | OUTPATIENT
Start: 2022-06-20 | End: 2022-06-21 | Stop reason: HOSPADM

## 2022-06-20 RX ORDER — ALBUTEROL SULFATE 2.5 MG/3ML
2.5 SOLUTION RESPIRATORY (INHALATION)
Status: DISCONTINUED | OUTPATIENT
Start: 2022-06-20 | End: 2022-06-21 | Stop reason: HOSPADM

## 2022-06-20 RX ORDER — PHENYLEPHRINE HYDROCHLORIDE 10 MG/ML
INJECTION, SOLUTION INTRAMUSCULAR; INTRAVENOUS; SUBCUTANEOUS PRN
Status: DISCONTINUED | OUTPATIENT
Start: 2022-06-20 | End: 2022-06-20 | Stop reason: SURG

## 2022-06-20 RX ORDER — SODIUM CHLORIDE, SODIUM LACTATE, POTASSIUM CHLORIDE, CALCIUM CHLORIDE 600; 310; 30; 20 MG/100ML; MG/100ML; MG/100ML; MG/100ML
INJECTION, SOLUTION INTRAVENOUS CONTINUOUS
Status: DISCONTINUED | OUTPATIENT
Start: 2022-06-20 | End: 2022-06-21 | Stop reason: HOSPADM

## 2022-06-20 RX ORDER — ONDANSETRON 2 MG/ML
4 INJECTION INTRAMUSCULAR; INTRAVENOUS ONCE
Status: COMPLETED | OUTPATIENT
Start: 2022-06-20 | End: 2022-06-20

## 2022-06-20 RX ORDER — PHENOL 1.4 %
AEROSOL, SPRAY (ML) MUCOUS MEMBRANE
COMMUNITY
End: 2022-10-26

## 2022-06-20 RX ORDER — HALOPERIDOL 5 MG/ML
1 INJECTION INTRAMUSCULAR
Status: DISCONTINUED | OUTPATIENT
Start: 2022-06-20 | End: 2022-06-21 | Stop reason: HOSPADM

## 2022-06-20 RX ORDER — HYDROMORPHONE HYDROCHLORIDE 1 MG/ML
0.1 INJECTION, SOLUTION INTRAMUSCULAR; INTRAVENOUS; SUBCUTANEOUS
Status: DISCONTINUED | OUTPATIENT
Start: 2022-06-20 | End: 2022-06-21 | Stop reason: HOSPADM

## 2022-06-20 RX ORDER — SODIUM CHLORIDE 9 MG/ML
INJECTION, SOLUTION INTRAVENOUS CONTINUOUS
Status: DISCONTINUED | OUTPATIENT
Start: 2022-06-20 | End: 2022-06-21

## 2022-06-20 RX ADMIN — PHENYLEPHRINE HYDROCHLORIDE 400 MCG: 10 INJECTION INTRAVENOUS at 21:20

## 2022-06-20 RX ADMIN — MORPHINE SULFATE 1 MG: 4 INJECTION INTRAVENOUS at 17:15

## 2022-06-20 RX ADMIN — METOCLOPRAMIDE 10 MG: 5 INJECTION, SOLUTION INTRAMUSCULAR; INTRAVENOUS at 21:39

## 2022-06-20 RX ADMIN — FENTANYL CITRATE 100 MCG: 50 INJECTION, SOLUTION INTRAMUSCULAR; INTRAVENOUS at 21:19

## 2022-06-20 RX ADMIN — CEFAZOLIN 2 G: 330 INJECTION, POWDER, FOR SOLUTION INTRAMUSCULAR; INTRAVENOUS at 21:14

## 2022-06-20 RX ADMIN — ONDANSETRON 4 MG: 2 INJECTION INTRAMUSCULAR; INTRAVENOUS at 21:39

## 2022-06-20 RX ADMIN — MORPHINE SULFATE 4 MG: 4 INJECTION INTRAVENOUS at 13:05

## 2022-06-20 RX ADMIN — SODIUM CHLORIDE, POTASSIUM CHLORIDE, SODIUM LACTATE AND CALCIUM CHLORIDE: 600; 310; 30; 20 INJECTION, SOLUTION INTRAVENOUS at 21:06

## 2022-06-20 RX ADMIN — HYDRALAZINE HYDROCHLORIDE 5 MG: 20 INJECTION, SOLUTION INTRAMUSCULAR; INTRAVENOUS at 23:10

## 2022-06-20 RX ADMIN — CLOSTRIDIUM TETANI TOXOID ANTIGEN (FORMALDEHYDE INACTIVATED), CORYNEBACTERIUM DIPHTHERIAE TOXOID ANTIGEN (FORMALDEHYDE INACTIVATED), BORDETELLA PERTUSSIS TOXOID ANTIGEN (GLUTARALDEHYDE INACTIVATED), BORDETELLA PERTUSSIS FILAMENTOUS HEMAGGLUTININ ANTIGEN (FORMALDEHYDE INACTIVATED), BORDETELLA PERTUSSIS PERTACTIN ANTIGEN, AND BORDETELLA PERTUSSIS FIMBRIAE 2/3 ANTIGEN 0.5 ML: 5; 2; 2.5; 5; 3; 5 INJECTION, SUSPENSION INTRAMUSCULAR at 14:55

## 2022-06-20 RX ADMIN — FENTANYL CITRATE 100 MCG: 50 INJECTION, SOLUTION INTRAMUSCULAR; INTRAVENOUS at 21:14

## 2022-06-20 RX ADMIN — PROPOFOL 100 MG: 10 INJECTION, EMULSION INTRAVENOUS at 21:14

## 2022-06-20 RX ADMIN — SODIUM CHLORIDE: 9 INJECTION, SOLUTION INTRAVENOUS at 17:16

## 2022-06-20 RX ADMIN — ONDANSETRON HYDROCHLORIDE 4 MG: 2 SOLUTION INTRAMUSCULAR; INTRAVENOUS at 13:05

## 2022-06-20 RX ADMIN — FENTANYL CITRATE 25 MCG: 50 INJECTION, SOLUTION INTRAMUSCULAR; INTRAVENOUS at 22:23

## 2022-06-20 RX ADMIN — PHENYLEPHRINE HYDROCHLORIDE 400 MCG: 10 INJECTION INTRAVENOUS at 21:28

## 2022-06-20 ASSESSMENT — ENCOUNTER SYMPTOMS
NEUROLOGICAL NEGATIVE: 1
CARDIOVASCULAR NEGATIVE: 1
GASTROINTESTINAL NEGATIVE: 1
PSYCHIATRIC NEGATIVE: 1
RESPIRATORY NEGATIVE: 1
EYES NEGATIVE: 1

## 2022-06-20 ASSESSMENT — FIBROSIS 4 INDEX: FIB4 SCORE: 1.51

## 2022-06-20 ASSESSMENT — PAIN DESCRIPTION - PAIN TYPE
TYPE: ACUTE PAIN;SURGICAL PAIN
TYPE: ACUTE PAIN;SURGICAL PAIN

## 2022-06-20 NOTE — ASSESSMENT & PLAN NOTE
Goals of care discussed with patient, daughter (Reena ) who is power of  at bedside.  They state that patient does not want any chest compressions if she has cardiac arrest, however is okay with intubation.

## 2022-06-20 NOTE — ED NOTES
Chief Complaint   Patient presents with   • GLF     Pt BIBA as TBI after losing balance while walking with her walker and fell and hit her posterior head. -LOC, -thinners, -c-spine tenderness. Pt reporting R sided hip pain.      Left leg internally rotated.  Lac to posterior head. GCS15.

## 2022-06-20 NOTE — ED PROVIDER NOTES
ED Provider Note    CHIEF COMPLAINT  Chief Complaint   Patient presents with   • GLF     Pt BIBA as TBI after losing balance while walking with her walker and fell and hit her posterior head. -LOC, -thinners, -c-spine tenderness. Pt reporting R sided hip pain.        HPI  Amirah Crane is a 86 y.o. female who presents via EMS for evauation following a fall.      Pt fell of a curb while using her walker, landing on her right side.  No LOC or anticoagulants.  Patient struck her head and complains of right hip pain as well.    Pt denies neck pain, weakness, numbness.    NPO since 10:30a after breakfast       ALLERGIES  Allergies   Allergen Reactions   • Pcn [Penicillins] Rash     Full body    • Pseudoephedrine      Other reaction(s): LEGS JERK       CURRENT MEDICATIONS  Home Medications     Reviewed by Annamaria Lee R.N. (Registered Nurse) on 06/20/22 at 1216  Med List Status: Partial   Medication Last Dose Status   acetaminophen (TYLENOL) 650 MG CR tablet  Active   alum & mag hydroxide-simeth (MYLANTA) 200-200-20 MG/5ML Suspension  Active   B Complex Vitamins (B COMPLEX PO)  Active   calcium carbonate (TUMS) 500 MG Chew Tab  Active   Calcium Carbonate-Vitamin D 600-200 MG-UNIT Tab  Active   celecoxib (CELEBREX) 200 MG Cap  Active   diclofenac sodium (VOLTAREN) 1 % Gel  Active   fluoxetine (PROZAC) 40 MG capsule  Active   fluticasone (FLONASE SENSIMIST) 27.5 MCG/SPRAY nasal spray  Active   hydrOXYzine HCl (ATARAX) 25 MG Tab  Active   Ibuprofen (ADVIL) 200 MG Cap  Active   magnesium hydroxide (MILK OF MAGNESIA) 400 MG/5ML Suspension  Active   magnesium oxide (MAG-OX) 400 MG Tab tablet  Active   MELATONIN ER PO  Active   metoprolol SR (TOPROL XL) 25 MG TABLET SR 24 HR  Active   mirtazapine (REMERON) 7.5 MG tablet  Active   nortriptyline (PAMELOR) 10 MG Cap  Active   Nutritional Supplements (BOOST) Liquid  Active   ondansetron (ZOFRAN ODT) 4 MG TABLET DISPERSIBLE  Active   oxybutynin (DITROPAN) 5 MG Tab  Active  "  pantoprazole (PROTONIX) 40 MG Tablet Delayed Response  Active   polyethylene glycol 3350 (MIRALAX) 17 GM/SCOOP Powder  Active   Simethicone (GAS-X MAXIMUM STRENGTH PO)  Active   therapeutic multivitamin-minerals (THERAGRAN-M) Tab  Active   traMADol (ULTRAM) 50 MG Tab  Active   Trolamine Salicylate (ASPER-FLEX EX)  Active   VITAMIN D PO  Active                PAST MEDICAL HISTORY   has a past medical history of GERD (gastroesophageal reflux disease).    SURGICAL HISTORY  patient denies any surgical history    SOCIAL HISTORY  Social History     Tobacco Use   • Smoking status: Never Smoker   • Smokeless tobacco: Never Used   Substance and Sexual Activity   • Alcohol use: Not on file   • Drug use: Not on file   • Sexual activity: Not on file     Here with her son    Family Hx:  denies      REVIEW OF SYSTEMS  See HPI for further details.  All other systems are negative except as above in HPI.    PHYSICAL EXAM  VITAL SIGNS: BP (!) 170/70   Pulse (!) 56   Temp 36.5 °C (97.7 °F) (Temporal)   Resp 18   Ht 1.651 m (5' 5\")   Wt 49.9 kg (110 lb)   SpO2 99%   BMI 18.30 kg/m²     General:  WD thin elderly female, lying on her right side; A+Ox3; V/S as above; elevated BP, afebrile  Skin: warm and dry; good color; no rash  HEENT: NC; 1cm laceration over right parieto-occipital area; no active bleeding; EOMs intact  Neck: No midline tenderness or step-offs  Cardiovascular: Regular heart rate and rhythm.  No murmurs, rubs, or gallops; pulses 2+ bilaterally radially and DP areas  Lungs: Clear to auscultation with good air movement bilaterally.  No wheezes, rhonchi, or rales.   Abdomen: BS present; soft; NTND; no rebound, guarding, or rigidity.  No organomegaly or pulsatile mass  Back: NT, no stepoffs  Extremities: THOMPSON x 3; right hip tenderness  Neurologic: CNs III-XII grossly intact; speech clear; distal sensation intact; strength 5/5 UE/LEs  Psychiatric: Appropriate affect, normal mood    LABS  pending    IMAGING  CT-HIP W/O " PLUS RECONS RIGHT   Final Result      1.  Comminuted, superiorly displaced intertrochanteric right proximal femoral fracture extending to the femoral neck.   2.  Postsurgical changes of prior left proximal femoral ORIF with nonunited fracture of the subtrochanteric proximal left femur.   3.  Remote deformity of the left pubic body/inferior pubic ramus. Acute on chronic fracture cannot be entirely excluded.   4.  No other evidence of acute pelvic fracture.      DX-FEMUR-2+ RIGHT   Final Result      1.  Comminuted and foreshortened right femoral intertrochanteric fracture.   2.  Remote left pubic body deformity.      DX-CHEST-PORTABLE (1 VIEW)   Final Result      1.  Markedly rotated exam. No definite acute cardiac or pulmonary abnormalities are identified.      DX-PELVIS-1 OR 2 VIEWS   Final Result      1.  Comminuted and foreshortened right femoral intertrochanteric fracture.   2.  Cortical disruption of the iliopubic line on the right concerning for nondisplaced fracture.   3.  Prior ORIF left femur with nonunited fracture of the proximal left femur.   4.  Remote left pubic body deformity.      CT-HEAD W/O   Final Result      1. Cerebral atrophy.   2. White matter lucencies most consistent with small vessel ischemic change versus demyelination or gliosis.   3. Otherwise, Head CT without contrast with no acute findings. No evidence of acute cerebral infarction, hemorrhage or mass lesion.   4. Soft tissue injury of the posterior right scalp.   5. Likely inspissated secretions in the right sphenoid sinus.      CT-CSPINE WITHOUT PLUS RECONS   Final Result      Degenerative changes of the cervical spine without acute fracture or malalignment.      DX-PORTABLE FLUOROSCOPY < 1 HOUR    (Results Pending)   DX-HIP-UNILATERAL-W/O PELVIS-2/3 VIEWS RIGHT    (Results Pending)         PROCEDURES  Laceration Repair Procedure Note    Indication: Laceration    Procedure: The patient was placed in the appropriate position and  anesthesia around the laceration was not performed. The area was then irrigated with high pressure normal saline and explored with no foreign bodies discovered. The laceration was closed with staples. There were no additional lacerations requiring repair.     Total repaired wound length: 1.5 cm.     Other Items: Staple count: 2    The patient tolerated the procedure well.    Complications: two staples were placed and did not adequately hold the wound edges.  These were removed with a staple remover which did not release one of the staples.  A second remover was used and successfully remove the second staple.  2 new staples were placed.  The patient tolerated this well.      MEDICAL RECORD  I have reviewed patient's medical record and pertinent results are listed below.      COURSE & MEDICAL DECISION MAKING  I have reviewed any medical record information, laboratory studies and radiographic results as noted.    Amirah Crane is a 86 y.o. female who presents complaining of trip and fall, head injury, right hip pain.  Patient is neurovascularly intact in the right extremity.    Appropriate PPE was worn at all times while interacting with the patient.    CT head and CT C-spine negative for acute pathology.    Hip x-ray noted to show a right intertrochanteric fracture.    2:59 PM  I discussed to will see the patient.  I discussed the case with Dr. Dixon from [facility] who will consult.  I have ordered CT pelvis/hip for further delineation of the injuries and these are pending.  Bloodwork is pending.    IMPRESSION  1. Fall, initial encounter     2. Closed head injury, initial encounter     3. Closed right hip fracture, initial encounter ([facility])     4. Scalp laceration, initial encounter         Electronically signed by: Bridgett Potts M.D., 6/20/2022 12:12 PM

## 2022-06-20 NOTE — ED TRIAGE NOTES
Chief Complaint   Patient presents with   • GLF     Pt BIBA as TBI after losing balance while walking with her walker and fell and hit her posterior head. -LOC, -thinners, -c-spine tenderness. Pt reporting R sided hip pain.          Pt BIBA for above complaint. Pt has hx of L sided hip and knee sx. Pt reporting R sided hip pain. EMS gave 100 of fent  And 4 mg of zofran in route.

## 2022-06-20 NOTE — ASSESSMENT & PLAN NOTE
Admit patient to orthopedic floor   --Orthopedic surgery consulted, patient underwent Surgical treatment of right intertrochanteric femur fracture with intramedullary device  -- PT and OT  recs  Post-acute    --medically cleared to be discharged poast-acute

## 2022-06-20 NOTE — CONSULTS
6/20/2022    Time Called: 1500  Time Arrived: 1520    The patient was seen at the request of ED    HPI: Amirah Crane is a 86 y.o. female who presents with complaints of pain to right hip.  This started earlier today after ground-level fall.  The pain is 10/10 and is described as sharp.  The pain is made worse by palpation of the area and made better by rest and immobilization.  Patient has a history of left femur fracture treated by Dr. Arndt.  Per family patient is otherwise fairly healthy.  She ambulates without the use of a cane or walker at baseline.    Past Medical History:   Diagnosis Date   • GERD (gastroesophageal reflux disease)        History reviewed. No pertinent surgical history.    Medications  No current facility-administered medications on file prior to encounter.     Current Outpatient Medications on File Prior to Encounter   Medication Sig Dispense Refill   • traMADol (ULTRAM) 50 MG Tab Take 50 mg by mouth 1 time a day as needed for Moderate Pain.     • magnesium hydroxide (MILK OF MAGNESIA) 400 MG/5ML Suspension Take 30 mL by mouth 1 time a day as needed (constipation).     • polyethylene glycol 3350 (MIRALAX) 17 GM/SCOOP Powder Take 17 g by mouth every day. Hold for loose stools     • mirtazapine (REMERON) 7.5 MG tablet Take 7.5 mg by mouth every evening.     • ondansetron (ZOFRAN ODT) 4 MG TABLET DISPERSIBLE Take 4 mg by mouth every 8 hours as needed for Nausea.     • oxybutynin (DITROPAN) 5 MG Tab Take 5 mg by mouth every day.     • fluticasone (FLONASE SENSIMIST) 27.5 MCG/SPRAY nasal spray Administer 1 Spray into affected nostril(S) every day. 1 spray both nostrils. 0.1 g 0   • fluoxetine (PROZAC) 40 MG capsule Take 1 Capsule by mouth every day. 30 Capsule 5   • diclofenac sodium (VOLTAREN) 1 % Gel Apply 2 g topically every 8 hours as needed (pain). Apply to hip or shoulder for joint pain. 50 g 11   • pantoprazole (PROTONIX) 40 MG Tablet Delayed Response Take 40 mg by mouth 2 times  a day.     • Nutritional Supplements (BOOST) Liquid Take 1 Container by mouth 2 (two) times a day. 51421 mL 5   • hydrOXYzine HCl (ATARAX) 25 MG Tab Take 1 Tablet by mouth 1 time a day as needed for Anxiety. (Patient taking differently: Take 12.5 mg by mouth 1 time a day as needed for Anxiety.) 30 Tablet 5   • alum & mag hydroxide-simeth (MYLANTA) 200-200-20 MG/5ML Suspension Take 15 mL by mouth 4 Times a Day,Before Meals and at Bedtime.     • calcium carbonate (TUMS) 500 MG Chew Tab Chew 500 mg every day.     • Simethicone (GAS-X MAXIMUM STRENGTH PO) Take 1 Tablet by mouth every day.     • MELATONIN ER PO Take 3 mg by mouth at bedtime as needed (insomnia).     • Trolamine Salicylate (ASPER-FLEX EX) Apply  topically.     • Ibuprofen (ADVIL) 200 MG Cap Take  by mouth.     • acetaminophen (TYLENOL) 650 MG CR tablet Take 1,300 mg by mouth every day. 2 tablets = 1300     • Calcium Carbonate-Vitamin D 600-200 MG-UNIT Tab Take 1 Tablet by mouth every day. (Patient not taking: Reported on 4/12/2022)     • VITAMIN D PO Take 2,000 Units by mouth every day.     • therapeutic multivitamin-minerals (THERAGRAN-M) Tab Take 1 Tablet by mouth every day. Centrum Silver Gummy (Patient not taking: Reported on 4/12/2022)     • B Complex Vitamins (B COMPLEX PO) Take 1 Tablet by mouth every day. (Patient not taking: Reported on 4/12/2022)     • metoprolol SR (TOPROL XL) 25 MG TABLET SR 24 HR Take 37.5 mg by mouth every day.     • nortriptyline (PAMELOR) 10 MG Cap TAKE 1 CAPSULES BY MOUTH EVERY NIGHT AT BEDTIME AS NEEDED FOR LEG CRAMPS. 60 Capsule 2   • celecoxib (CELEBREX) 200 MG Cap Take 1 Capsule by mouth every day. 90 Capsule 1   • magnesium oxide (MAG-OX) 400 MG Tab tablet Take 400 mg by mouth every day.         Allergies  Pcn [penicillins] and Pseudoephedrine    ROS  . All other systems were reviewed and found to be negative    History reviewed. No pertinent family history.    Social History     Socioeconomic History   • Marital  "status: Single   Tobacco Use   • Smoking status: Never Smoker   • Smokeless tobacco: Never Used       Physical Exam  Vitals  BP (!) 193/77   Pulse (!) 56   Temp 36.5 °C (97.7 °F) (Temporal)   Resp 16   Ht 1.651 m (5' 5\")   Wt 49.9 kg (110 lb)   SpO2 94%   General: Well Developed, Well Nourished, Age appropriate appearance  HEENT: Normocephalic, atraumatic  Psych: Normal mood and affect  Neck: Supple, nontender, no masses  Lungs: Breathing unlabored, No audible wheezing  Heart: Regular heart rate and rhythm  Abdomen: Soft, NT, ND  Neuro: Sensation grossly intact to BUE and BLE, moving all four extremities  Skin: Intact, no open wounds  Vascular: Feet are warm and well-perfused, Capillary refill <2 seconds  MSK: Right lower extremity: Pain with logroll.  Sensation intact distally.      Radiographs:  DX-FEMUR-2+ RIGHT   Final Result      1.  Comminuted and foreshortened right femoral intertrochanteric fracture.   2.  Remote left pubic body deformity.      DX-CHEST-PORTABLE (1 VIEW)   Final Result      1.  Markedly rotated exam. No definite acute cardiac or pulmonary abnormalities are identified.      DX-PELVIS-1 OR 2 VIEWS   Final Result      1.  Comminuted and foreshortened right femoral intertrochanteric fracture.   2.  Cortical disruption of the iliopubic line on the right concerning for nondisplaced fracture.   3.  Prior ORIF left femur with nonunited fracture of the proximal left femur.   4.  Remote left pubic body deformity.      CT-HEAD W/O   Final Result      1. Cerebral atrophy.   2. White matter lucencies most consistent with small vessel ischemic change versus demyelination or gliosis.   3. Otherwise, Head CT without contrast with no acute findings. No evidence of acute cerebral infarction, hemorrhage or mass lesion.   4. Soft tissue injury of the posterior right scalp.   5. Likely inspissated secretions in the right sphenoid sinus.      CT-CSPINE WITHOUT PLUS RECONS   Final Result      Degenerative " changes of the cervical spine without acute fracture or malalignment.      CT-HIP W/O PLUS RECONS RIGHT    (Results Pending)       Laboratory Values      Recent Labs     06/20/22  1231   SODIUM 135   POTASSIUM 4.3   CHLORIDE 102   CO2 22   GLUCOSE 103*   BUN 26*     Recent Labs     06/20/22  1231   INR 1.02         Impression: 86-year-old female ground-level fall with right comminuted intra-articular proximal femur fracture.    Plan:We discussed the diagnosis and findings with the patient at length.  We reviewed possible non operative and operative interventions and the risks and benefits of each of these.  she had a chance to ask questions and all of these were answered to her satisfaction. The patient chose to proceed with surgical intervention. Risks and benefits of surgery were discussed which include but are not limited to bleeding, infection, neurovascular damage, malunion, nonunion, instability, limb length discrepancy, DVT, PE, MI, Stroke and death. They understand these risks and wish to proceed.      N.p.o. for surgery this evening around 6 PM  Nonweightbearing until surgery  Admit to medicine service    José Antonio Dixon MD  Orthopedic Trauma Surgery

## 2022-06-20 NOTE — H&P
Hospital Medicine History & Physical Note    Date of Service  6/20/2022    Primary Care Physician  Tamara Culver P.A.-C.    Consultants  Orthopedic surgery    Code Status  DNAR, I OK    Chief Complaint  Chief Complaint   Patient presents with   • GLF     Pt BIBA as TBI after losing balance while walking with her walker and fell and hit her posterior head. -LOC, -thinners, -c-spine tenderness. Pt reporting R sided hip pain.        History of Presenting Illness  Amirah Crane is a 86 y.o. female who presented 6/20/2022 with a witnessed mechanical fall today.  Patient and her son were eating out at a restaurant when after eating, she was ambulating with her walker when it suddenly got away from her and causing her to fall and hit her head.  No loss of consciousness was noted.  Noted immediate right hip pain after to which EMS was called to take patient to the ER for evaluation.    In the ED, patient found to have elevated blood pressure.  Chest x-ray negative for acute cardiopulmonary abnormality.  X-ray pelvis showing comminuted and foreshortened right femoral intratrochanteric fracture.  Also noted to have cortical destruction of the iliopubic line on the right concerning for nondisplaced fracture.  Orthopedic surgery consulted, recommended CT hip for further evaluation.    I discussed the plan of care with patient.    Review of Systems  Review of Systems   Constitutional: Positive for malaise/fatigue.   HENT: Negative.    Eyes: Negative.    Respiratory: Negative.    Cardiovascular: Negative.    Gastrointestinal: Negative.    Genitourinary: Negative.    Musculoskeletal: Positive for joint pain.   Skin: Negative.    Neurological: Negative.    Endo/Heme/Allergies: Negative.    Psychiatric/Behavioral: Negative.        Past Medical History   has a past medical history of GERD (gastroesophageal reflux disease).    Surgical History  No pertinent surgical history    Family History   Family history reviewed with  patient. There is no family history that is pertinent to the chief complaint.     Social History   reports that she has never smoked. She has never used smokeless tobacco.    Allergies  Allergies   Allergen Reactions   • Pcn [Penicillins] Rash     Full body    • Pseudoephedrine      Other reaction(s): LEGS JERK       Medications  Prior to Admission Medications   Prescriptions Last Dose Informant Patient Reported? Taking?   B Complex Vitamins (B COMPLEX PO)  Patient Yes No   Sig: Take 1 Tablet by mouth every day.   Patient not taking: Reported on 4/12/2022   Calcium Carbonate-Vitamin D 600-200 MG-UNIT Tab  Patient Yes No   Sig: Take 1 Tablet by mouth every day.   Patient not taking: Reported on 4/12/2022   Ibuprofen (ADVIL) 200 MG Cap   Yes No   Sig: Take  by mouth.   MELATONIN ER PO   Yes No   Sig: Take 3 mg by mouth at bedtime as needed (insomnia).   Nutritional Supplements (BOOST) Liquid   No No   Sig: Take 1 Container by mouth 2 (two) times a day.   Simethicone (GAS-X MAXIMUM STRENGTH PO)   Yes No   Sig: Take 1 Tablet by mouth every day.   Trolamine Salicylate (ASPER-FLEX EX)   Yes No   Sig: Apply  topically.   VITAMIN D PO  Patient Yes No   Sig: Take 2,000 Units by mouth every day.   acetaminophen (TYLENOL) 650 MG CR tablet  Patient Yes No   Sig: Take 1,300 mg by mouth every day. 2 tablets = 1300   alum & mag hydroxide-simeth (MYLANTA) 200-200-20 MG/5ML Suspension   Yes No   Sig: Take 15 mL by mouth 4 Times a Day,Before Meals and at Bedtime.   calcium carbonate (TUMS) 500 MG Chew Tab   Yes No   Sig: Chew 500 mg every day.   celecoxib (CELEBREX) 200 MG Cap  Patient No No   Sig: Take 1 Capsule by mouth every day.   diclofenac sodium (VOLTAREN) 1 % Gel   No No   Sig: Apply 2 g topically every 8 hours as needed (pain). Apply to hip or shoulder for joint pain.   fluoxetine (PROZAC) 40 MG capsule   No No   Sig: Take 1 Capsule by mouth every day.   fluticasone (FLONASE SENSIMIST) 27.5 MCG/SPRAY nasal spray   No No    Sig: Administer 1 Spray into affected nostril(S) every day. 1 spray both nostrils.   hydrOXYzine HCl (ATARAX) 25 MG Tab   No No   Sig: Take 1 Tablet by mouth 1 time a day as needed for Anxiety.   Patient taking differently: Take 12.5 mg by mouth 1 time a day as needed for Anxiety.   magnesium hydroxide (MILK OF MAGNESIA) 400 MG/5ML Suspension   Yes No   Sig: Take 30 mL by mouth 1 time a day as needed (constipation).   magnesium oxide (MAG-OX) 400 MG Tab tablet  Patient Yes No   Sig: Take 400 mg by mouth every day.   metoprolol SR (TOPROL XL) 25 MG TABLET SR 24 HR  Patient Yes No   Sig: Take 37.5 mg by mouth every day.   mirtazapine (REMERON) 7.5 MG tablet   Yes No   Sig: Take 7.5 mg by mouth every evening.   nortriptyline (PAMELOR) 10 MG Cap  Patient No No   Sig: TAKE 1 CAPSULES BY MOUTH EVERY NIGHT AT BEDTIME AS NEEDED FOR LEG CRAMPS.   ondansetron (ZOFRAN ODT) 4 MG TABLET DISPERSIBLE   Yes No   Sig: Take 4 mg by mouth every 8 hours as needed for Nausea.   oxybutynin (DITROPAN) 5 MG Tab   Yes No   Sig: Take 5 mg by mouth every day.   pantoprazole (PROTONIX) 40 MG Tablet Delayed Response   Yes No   Sig: Take 40 mg by mouth 2 times a day.   polyethylene glycol 3350 (MIRALAX) 17 GM/SCOOP Powder   Yes No   Sig: Take 17 g by mouth every day. Hold for loose stools   therapeutic multivitamin-minerals (THERAGRAN-M) Tab  Patient Yes No   Sig: Take 1 Tablet by mouth every day. Centrum Silver Gummy   Patient not taking: Reported on 4/12/2022   traMADol (ULTRAM) 50 MG Tab   Yes No   Sig: Take 50 mg by mouth 1 time a day as needed for Moderate Pain.      Facility-Administered Medications: None       Physical Exam  Temp:  [36.5 °C (97.7 °F)] 36.5 °C (97.7 °F)  Pulse:  [56] 56  Resp:  [15-16] 16  BP: (182-193)/(75-77) 193/77  SpO2:  [93 %-94 %] 94 %  Blood Pressure : (!) 193/77   Temperature: 36.5 °C (97.7 °F)   Pulse: (!) 56   Respiration: 16   Pulse Oximetry: 94 %       Physical Exam  Constitutional:       Appearance: Normal  appearance. She is normal weight.   HENT:      Head: Normocephalic.      Nose: Nose normal.      Mouth/Throat:      Mouth: Mucous membranes are moist.   Eyes:      Pupils: Pupils are equal, round, and reactive to light.   Cardiovascular:      Rate and Rhythm: Normal rate and regular rhythm.   Pulmonary:      Effort: Pulmonary effort is normal.      Breath sounds: Normal breath sounds.   Abdominal:      General: Abdomen is flat. Bowel sounds are normal.      Palpations: Abdomen is soft.   Musculoskeletal:      Comments: Right lower extremity externally rotated.  Range of motion limited due to pain.  Dorsalis pedis 1+ bilaterally   Skin:     General: Skin is warm.   Neurological:      Mental Status: She is alert and oriented to person, place, and time. Mental status is at baseline.   Psychiatric:         Mood and Affect: Mood normal.         Laboratory:      Recent Labs     06/20/22  1231   SODIUM 135   POTASSIUM 4.3   CHLORIDE 102   CO2 22   GLUCOSE 103*   BUN 26*   CREATININE 0.75   CALCIUM 8.9     Recent Labs     06/20/22  1231   GLUCOSE 103*     Recent Labs     06/20/22  1231   INR 1.02     No results for input(s): NTPROBNP in the last 72 hours.      No results for input(s): TROPONINT in the last 72 hours.    Imaging:  DX-FEMUR-2+ RIGHT   Final Result      1.  Comminuted and foreshortened right femoral intertrochanteric fracture.   2.  Remote left pubic body deformity.      DX-CHEST-PORTABLE (1 VIEW)   Final Result      1.  Markedly rotated exam. No definite acute cardiac or pulmonary abnormalities are identified.      DX-PELVIS-1 OR 2 VIEWS   Final Result      1.  Comminuted and foreshortened right femoral intertrochanteric fracture.   2.  Cortical disruption of the iliopubic line on the right concerning for nondisplaced fracture.   3.  Prior ORIF left femur with nonunited fracture of the proximal left femur.   4.  Remote left pubic body deformity.      CT-HEAD W/O   Final Result      1. Cerebral atrophy.   2.  White matter lucencies most consistent with small vessel ischemic change versus demyelination or gliosis.   3. Otherwise, Head CT without contrast with no acute findings. No evidence of acute cerebral infarction, hemorrhage or mass lesion.   4. Soft tissue injury of the posterior right scalp.   5. Likely inspissated secretions in the right sphenoid sinus.      CT-CSPINE WITHOUT PLUS RECONS   Final Result      Degenerative changes of the cervical spine without acute fracture or malalignment.      CT-HIP W/O PLUS RECONS RIGHT    (Results Pending)       X-Ray:  I have personally reviewed the images and compared with prior images.    Assessment/Plan:  Justification for Admission Status  I anticipate this patient will require at least two midnights for appropriate medical management, necessitating inpatient admission because hip fracture    * Closed intertrochanteric fracture of right hip, initial encounter (HCC)- (present on admission)  Assessment & Plan  Admit patient to orthopedic floor  Orthopedic surgery consulted, for OR tonight   Morphine every 4 hours as needed  PT OT  Normal saline 125 mL/h  Weightbearing as tolerated      Advanced care planning/counseling discussion  Assessment & Plan  Goals of care discussed with patient, daughter (Reena ) who is power of  at bedside.  They state that patient does not want any chest compressions if she has cardiac arrest, however is okay with intubation.    Palpitation- (present on admission)  Assessment & Plan  Continue home dose metoprolol      VTE prophylaxis: heparin ppx

## 2022-06-21 PROBLEM — D64.9 ANEMIA: Status: ACTIVE | Noted: 2022-06-21

## 2022-06-21 PROCEDURE — 99233 SBSQ HOSP IP/OBS HIGH 50: CPT | Performed by: HOSPITALIST

## 2022-06-21 PROCEDURE — 700102 HCHG RX REV CODE 250 W/ 637 OVERRIDE(OP): Performed by: ORTHOPAEDIC SURGERY

## 2022-06-21 PROCEDURE — 700102 HCHG RX REV CODE 250 W/ 637 OVERRIDE(OP): Performed by: STUDENT IN AN ORGANIZED HEALTH CARE EDUCATION/TRAINING PROGRAM

## 2022-06-21 PROCEDURE — A9270 NON-COVERED ITEM OR SERVICE: HCPCS | Performed by: STUDENT IN AN ORGANIZED HEALTH CARE EDUCATION/TRAINING PROGRAM

## 2022-06-21 PROCEDURE — 700111 HCHG RX REV CODE 636 W/ 250 OVERRIDE (IP): Performed by: ORTHOPAEDIC SURGERY

## 2022-06-21 PROCEDURE — 770001 HCHG ROOM/CARE - MED/SURG/GYN PRIV*

## 2022-06-21 PROCEDURE — A9270 NON-COVERED ITEM OR SERVICE: HCPCS | Performed by: ORTHOPAEDIC SURGERY

## 2022-06-21 PROCEDURE — 97162 PT EVAL MOD COMPLEX 30 MIN: CPT

## 2022-06-21 PROCEDURE — 97166 OT EVAL MOD COMPLEX 45 MIN: CPT

## 2022-06-21 RX ORDER — OXYCODONE HYDROCHLORIDE 10 MG/1
10 TABLET ORAL
Status: DISCONTINUED | OUTPATIENT
Start: 2022-06-21 | End: 2022-06-23 | Stop reason: HOSPADM

## 2022-06-21 RX ORDER — KETOROLAC TROMETHAMINE 30 MG/ML
15 INJECTION, SOLUTION INTRAMUSCULAR; INTRAVENOUS EVERY 6 HOURS
Status: DISCONTINUED | OUTPATIENT
Start: 2022-06-21 | End: 2022-06-22

## 2022-06-21 RX ORDER — SCOLOPAMINE TRANSDERMAL SYSTEM 1 MG/1
1 PATCH, EXTENDED RELEASE TRANSDERMAL
Status: DISCONTINUED | OUTPATIENT
Start: 2022-06-21 | End: 2022-06-23 | Stop reason: HOSPADM

## 2022-06-21 RX ORDER — ONDANSETRON 2 MG/ML
4 INJECTION INTRAMUSCULAR; INTRAVENOUS EVERY 4 HOURS PRN
Status: DISCONTINUED | OUTPATIENT
Start: 2022-06-21 | End: 2022-06-23 | Stop reason: HOSPADM

## 2022-06-21 RX ORDER — ACETAMINOPHEN 325 MG/1
650 TABLET ORAL EVERY 6 HOURS
Status: DISCONTINUED | OUTPATIENT
Start: 2022-06-21 | End: 2022-06-23 | Stop reason: HOSPADM

## 2022-06-21 RX ORDER — POLYETHYLENE GLYCOL 3350 17 G/17G
1 POWDER, FOR SOLUTION ORAL 2 TIMES DAILY PRN
Status: DISCONTINUED | OUTPATIENT
Start: 2022-06-21 | End: 2022-06-23 | Stop reason: HOSPADM

## 2022-06-21 RX ORDER — AMOXICILLIN 250 MG
1 CAPSULE ORAL NIGHTLY
Status: DISCONTINUED | OUTPATIENT
Start: 2022-06-21 | End: 2022-06-23 | Stop reason: HOSPADM

## 2022-06-21 RX ORDER — ACETAMINOPHEN 325 MG/1
650 TABLET ORAL EVERY 6 HOURS PRN
Status: DISCONTINUED | OUTPATIENT
Start: 2022-06-26 | End: 2022-06-23 | Stop reason: HOSPADM

## 2022-06-21 RX ORDER — AMOXICILLIN 250 MG
1 CAPSULE ORAL
Status: DISCONTINUED | OUTPATIENT
Start: 2022-06-21 | End: 2022-06-23 | Stop reason: HOSPADM

## 2022-06-21 RX ORDER — DIPHENHYDRAMINE HYDROCHLORIDE 50 MG/ML
25 INJECTION INTRAMUSCULAR; INTRAVENOUS EVERY 6 HOURS PRN
Status: DISCONTINUED | OUTPATIENT
Start: 2022-06-21 | End: 2022-06-23 | Stop reason: HOSPADM

## 2022-06-21 RX ORDER — ENEMA 19; 7 G/133ML; G/133ML
1 ENEMA RECTAL
Status: DISCONTINUED | OUTPATIENT
Start: 2022-06-21 | End: 2022-06-23 | Stop reason: HOSPADM

## 2022-06-21 RX ORDER — ENOXAPARIN SODIUM 100 MG/ML
40 INJECTION SUBCUTANEOUS
Status: DISCONTINUED | OUTPATIENT
Start: 2022-06-21 | End: 2022-06-23 | Stop reason: HOSPADM

## 2022-06-21 RX ORDER — BISACODYL 10 MG
10 SUPPOSITORY, RECTAL RECTAL
Status: DISCONTINUED | OUTPATIENT
Start: 2022-06-21 | End: 2022-06-23 | Stop reason: HOSPADM

## 2022-06-21 RX ORDER — IBUPROFEN 800 MG/1
800 TABLET ORAL 3 TIMES DAILY PRN
Status: DISCONTINUED | OUTPATIENT
Start: 2022-06-24 | End: 2022-06-22

## 2022-06-21 RX ORDER — DEXAMETHASONE SODIUM PHOSPHATE 4 MG/ML
4 INJECTION, SOLUTION INTRA-ARTICULAR; INTRALESIONAL; INTRAMUSCULAR; INTRAVENOUS; SOFT TISSUE
Status: DISCONTINUED | OUTPATIENT
Start: 2022-06-21 | End: 2022-06-23 | Stop reason: HOSPADM

## 2022-06-21 RX ORDER — HYDROMORPHONE HYDROCHLORIDE 1 MG/ML
0.5 INJECTION, SOLUTION INTRAMUSCULAR; INTRAVENOUS; SUBCUTANEOUS
Status: DISCONTINUED | OUTPATIENT
Start: 2022-06-21 | End: 2022-06-23 | Stop reason: HOSPADM

## 2022-06-21 RX ORDER — HALOPERIDOL 5 MG/ML
1 INJECTION INTRAMUSCULAR EVERY 6 HOURS PRN
Status: DISCONTINUED | OUTPATIENT
Start: 2022-06-21 | End: 2022-06-23 | Stop reason: HOSPADM

## 2022-06-21 RX ORDER — CEFAZOLIN SODIUM 2 G/100ML
2 INJECTION, SOLUTION INTRAVENOUS ONCE
Status: COMPLETED | OUTPATIENT
Start: 2022-06-21 | End: 2022-06-21

## 2022-06-21 RX ORDER — DOCUSATE SODIUM 100 MG/1
100 CAPSULE, LIQUID FILLED ORAL 2 TIMES DAILY
Status: DISCONTINUED | OUTPATIENT
Start: 2022-06-21 | End: 2022-06-23 | Stop reason: HOSPADM

## 2022-06-21 RX ORDER — OXYCODONE HYDROCHLORIDE 5 MG/1
5 TABLET ORAL
Status: DISCONTINUED | OUTPATIENT
Start: 2022-06-21 | End: 2022-06-23 | Stop reason: HOSPADM

## 2022-06-21 RX ADMIN — DOCUSATE SODIUM 100 MG: 100 CAPSULE, LIQUID FILLED ORAL at 16:52

## 2022-06-21 RX ADMIN — KETOROLAC TROMETHAMINE 15 MG: 30 INJECTION, SOLUTION INTRAMUSCULAR; INTRAVENOUS at 13:31

## 2022-06-21 RX ADMIN — DOCUSATE SODIUM 100 MG: 100 CAPSULE, LIQUID FILLED ORAL at 07:42

## 2022-06-21 RX ADMIN — SENNOSIDES AND DOCUSATE SODIUM 1 TABLET: 50; 8.6 TABLET ORAL at 20:33

## 2022-06-21 RX ADMIN — FLUOXETINE 40 MG: 20 CAPSULE ORAL at 05:31

## 2022-06-21 RX ADMIN — KETOROLAC TROMETHAMINE 15 MG: 30 INJECTION, SOLUTION INTRAMUSCULAR; INTRAVENOUS at 07:42

## 2022-06-21 RX ADMIN — ACETAMINOPHEN 650 MG: 325 TABLET ORAL at 13:31

## 2022-06-21 RX ADMIN — ACETAMINOPHEN 650 MG: 325 TABLET ORAL at 07:42

## 2022-06-21 RX ADMIN — KETOROLAC TROMETHAMINE 15 MG: 30 INJECTION, SOLUTION INTRAMUSCULAR; INTRAVENOUS at 20:33

## 2022-06-21 RX ADMIN — ENOXAPARIN SODIUM 40 MG: 40 INJECTION SUBCUTANEOUS at 11:30

## 2022-06-21 RX ADMIN — ACETAMINOPHEN 650 MG: 325 TABLET ORAL at 20:33

## 2022-06-21 RX ADMIN — METOPROLOL SUCCINATE 37.5 MG: 25 TABLET, EXTENDED RELEASE ORAL at 05:30

## 2022-06-21 RX ADMIN — CEFAZOLIN SODIUM 2 G: 2 INJECTION, SOLUTION INTRAVENOUS at 08:00

## 2022-06-21 ASSESSMENT — PAIN DESCRIPTION - PAIN TYPE
TYPE: SURGICAL PAIN
TYPE: ACUTE PAIN;SURGICAL PAIN
TYPE: SURGICAL PAIN
TYPE: SURGICAL PAIN
TYPE: ACUTE PAIN;SURGICAL PAIN
TYPE: SURGICAL PAIN

## 2022-06-21 ASSESSMENT — LIFESTYLE VARIABLES
EVER FELT BAD OR GUILTY ABOUT YOUR DRINKING: NO
CONSUMPTION TOTAL: NEGATIVE
TOTAL SCORE: 0
HOW MANY TIMES IN THE PAST YEAR HAVE YOU HAD 5 OR MORE DRINKS IN A DAY: 0
TOTAL SCORE: 0
TOTAL SCORE: 0
AVERAGE NUMBER OF DAYS PER WEEK YOU HAVE A DRINK CONTAINING ALCOHOL: 2
EVER HAD A DRINK FIRST THING IN THE MORNING TO STEADY YOUR NERVES TO GET RID OF A HANGOVER: NO
HAVE PEOPLE ANNOYED YOU BY CRITICIZING YOUR DRINKING: NO
ON A TYPICAL DAY WHEN YOU DRINK ALCOHOL HOW MANY DRINKS DO YOU HAVE: 1
ALCOHOL_USE: YES
HAVE YOU EVER FELT YOU SHOULD CUT DOWN ON YOUR DRINKING: NO

## 2022-06-21 ASSESSMENT — ACTIVITIES OF DAILY LIVING (ADL): TOILETING: INDEPENDENT

## 2022-06-21 ASSESSMENT — PATIENT HEALTH QUESTIONNAIRE - PHQ9
1. LITTLE INTEREST OR PLEASURE IN DOING THINGS: NOT AT ALL
SUM OF ALL RESPONSES TO PHQ9 QUESTIONS 1 AND 2: 0
2. FEELING DOWN, DEPRESSED, IRRITABLE, OR HOPELESS: NOT AT ALL

## 2022-06-21 ASSESSMENT — COGNITIVE AND FUNCTIONAL STATUS - GENERAL
MOBILITY SCORE: 14
EATING MEALS: A LITTLE
MOVING TO AND FROM BED TO CHAIR: UNABLE
TOILETING: A LOT
HELP NEEDED FOR BATHING: A LOT
CLIMB 3 TO 5 STEPS WITH RAILING: TOTAL
PERSONAL GROOMING: A LOT
PERSONAL GROOMING: A LITTLE
SUGGESTED CMS G CODE MODIFIER DAILY ACTIVITY: CL
MOVING FROM LYING ON BACK TO SITTING ON SIDE OF FLAT BED: UNABLE
SUGGESTED CMS G CODE MODIFIER DAILY ACTIVITY: CK
TURNING FROM BACK TO SIDE WHILE IN FLAT BAD: A LOT
MOVING FROM LYING ON BACK TO SITTING ON SIDE OF FLAT BED: A LOT
EATING MEALS: A LITTLE
DRESSING REGULAR UPPER BODY CLOTHING: A LOT
DRESSING REGULAR LOWER BODY CLOTHING: A LOT
SUGGESTED CMS G CODE MODIFIER MOBILITY: CL
DAILY ACTIVITIY SCORE: 14
TURNING FROM BACK TO SIDE WHILE IN FLAT BAD: UNABLE
SUGGESTED CMS G CODE MODIFIER MOBILITY: CN
MOVING TO AND FROM BED TO CHAIR: A LOT
WALKING IN HOSPITAL ROOM: A LITTLE
CLIMB 3 TO 5 STEPS WITH RAILING: A LOT
TOILETING: A LOT
DRESSING REGULAR UPPER BODY CLOTHING: A LOT
DRESSING REGULAR LOWER BODY CLOTHING: A LOT
STANDING UP FROM CHAIR USING ARMS: A LITTLE
STANDING UP FROM CHAIR USING ARMS: TOTAL
MOBILITY SCORE: 6
DAILY ACTIVITIY SCORE: 13
WALKING IN HOSPITAL ROOM: TOTAL
HELP NEEDED FOR BATHING: A LOT

## 2022-06-21 ASSESSMENT — ENCOUNTER SYMPTOMS
PALPITATIONS: 0
DIARRHEA: 0
SPUTUM PRODUCTION: 0
CLAUDICATION: 0
HEMOPTYSIS: 0
SHORTNESS OF BREATH: 0
HEARTBURN: 0
BLURRED VISION: 0
HEADACHES: 0
NERVOUS/ANXIOUS: 1
FEVER: 0
SORE THROAT: 0
MYALGIAS: 1
ORTHOPNEA: 0
COUGH: 0
DOUBLE VISION: 0

## 2022-06-21 ASSESSMENT — FIBROSIS 4 INDEX
FIB4 SCORE: 1.74
FIB4 SCORE: 1.74

## 2022-06-21 ASSESSMENT — GAIT ASSESSMENTS: GAIT LEVEL OF ASSIST: UNABLE TO PARTICIPATE

## 2022-06-21 NOTE — CARE PLAN
Problem: Pain - Standard  Goal: Alleviation of pain or a reduction in pain to the patient’s comfort goal  Outcome: Progressing     Problem: Knowledge Deficit - Standard  Goal: Patient and family/care givers will demonstrate understanding of plan of care, disease process/condition, diagnostic tests and medications  Outcome: Progressing     Problem: Skin Integrity  Goal: Skin integrity is maintained or improved  Outcome: Progressing     Problem: Fall Risk  Goal: Patient will remain free from falls  Outcome: Progressing     The patient is Stable - Low risk of patient condition declining or worsening    Shift Goals  Clinical Goals: Safety, pain control, mobility  Patient Goals: Rest    Progress made toward(s) clinical / shift goals:  Pt educated about 0 to 10 pain scale. Pain well controlled with scheduled pain medications. Discussed POC with pt. Pt verbalized understanding. Pt on waffle overlay, Q2 hour turns. Bed locked and in lowest position. Bed alarm on. Hourly rounding in place.    Patient is not progressing towards the following goals:

## 2022-06-21 NOTE — ANESTHESIA PROCEDURE NOTES
Peripheral IV    Date/Time: 6/20/2022 9:16 PM  Performed by: Umer Valerio M.D.  Authorized by: Umer Valerio M.D.     Size:  20 G  Laterality:  Left  Site Prep:  Alcohol  Technique:  Direct puncture  Attempts:  1

## 2022-06-21 NOTE — ANESTHESIA PREPROCEDURE EVALUATION
Case: [id number] Date/Time: 06/20/22 7595    Procedure: INSERTION, INTRAMEDULLARY AKASH, FEMUR (Right Hip)    Anesthesia type: General    Pre-op diagnosis: Comminuted and foreshortened right femoral intertrochanteric fracture.    Location: [place] [address] / SURGERY [facility]    Surgeons: Alberto Arndt M.D.          Relevant Problems   PULMONARY   (positive) History of UTI      NEURO   (positive) History of UTI      CARDIAC   (positive) Hot flashes   (positive) SVT (supraventricular tachycardia) (HCC)      GI   (positive) GERD (gastroesophageal reflux disease)      Other   (positive) Spondylarthritis       Physical Exam    Airway   Mallampati: II  TM distance: >3 FB  Neck ROM: full       Cardiovascular - normal exam  Rhythm: regular  Rate: normal  (-) murmur     Dental - normal exam           Pulmonary - normal exam  Breath sounds clear to auscultation     Abdominal    Neurological - normal exam                 Anesthesia Plan    ASA 3       Plan - general       Airway plan will be ETT          Induction: intravenous    Postoperative Plan: Postoperative administration of opioids is intended.    Pertinent diagnostic labs and testing reviewed    Informed Consent:    Anesthetic plan and risks discussed with patient.    Use of blood products discussed with: patient whom consented to blood products.       reflux svt

## 2022-06-21 NOTE — THERAPY
Occupational Therapy   Initial Evaluation     Patient Name: Amirah Crane  Age:  86 y.o., Sex:  female  Medical Record #: [id number]  Today's Date: 6/21/2022     Precautions  Precautions: Fall Risk, Weight Bearing As Tolerated Right Lower Extremity    Assessment  Patient is 86 y.o. female with a diagnosis of R femur fx s/p IMN. Full ADL assessment limited by increased dizziness and slight decreased responsiveness after standing - pt was assisted BTB. Will continue to assess and progress. Additional factors influencing patient status / progress: weakness, fatigue, impaired balance, pain.      Plan    Recommend Occupational Therapy 4 times per week until therapy goals are met for the following treatments:  Adaptive Equipment, Neuro Re-Education / Balance, Self Care/Activities of Daily Living, Therapeutic Activities, and Therapeutic Exercises.    DC Equipment Recommendations: Unable to determine at this time  Discharge Recommendations: Recommend post-acute placement for additional occupational therapy services prior to discharge home          Objective     06/21/22 1050   Prior Living Situation   Prior Services Intermittent Physical Support for ADL Per Service;Housekeeping / Homemaker Services   Housing / Facility 1 Story Apartment / Condo   Bathroom Set up Walk In Shower;Grab Bars;Shower Chair   Equipment Owned 4-Wheel Walker;Front-Wheel Walker;Tub / Shower Seat;Hospital Bed  (adjustable bed w/o rails)   Lives with - Patient's Self Care Capacity Alone and Unable to Care For Self   Comments pt lives at [facility]; she gets assistance for IADLs and showers/LB dressing   Prior Level of ADL Function   Self Feeding Independent   Grooming / Hygiene Independent   Bathing Requires Assist   Dressing Requires Assist   Toileting Independent   Prior Level of IADL Function   Medication Management Requires Assist   Laundry Requires Assist   Kitchen Mobility Requires Assist   Finances Requires Assist   Home Management  Requires Assist   Shopping Requires Assist   Prior Level Of Mobility Independent With Device in Community;Independent With Device in Home   Driving / Transportation Relatives / Others Provide Transportation   History of Falls   History of Falls Yes   Precautions   Precautions Fall Risk;Weight Bearing As Tolerated Right Lower Extremity   Pain 0 - 10 Group   Therapist Pain Assessment Nurse Notified;During Activity  (mod c/o R hip pain)   Cognition    Cognition / Consciousness X   Level of Consciousness Alert   Comments pleasant and cooperative, softspoken   Active ROM Upper Body   Active ROM Upper Body  X   Dominant Hand Right   Comments BUE shoulders limited to about 1/4 range flexion; baseline   Strength Upper Body   Upper Body Strength  X   Gross Strength Generalized Weakness, Equal Bilaterally.    Balance Assessment   Sitting Balance (Static) Fair   Sitting Balance (Dynamic) Fair -   Standing Balance (Static) Trace +   Standing Balance (Dynamic) Trace   Weight Shift Sitting Poor   Weight Shift Standing Poor   Comments w/ FWW   Bed Mobility    Supine to Sit Moderate Assist   Sit to Supine Maximal Assist   Scooting Maximal Assist   ADL Assessment   Lower Body Dressing Maximal Assist   Toileting Maximal Assist   How much help from another person does the patient currently need...   Putting on and taking off regular lower body clothing? 2   Bathing (including washing, rinsing, and drying)? 2   Toileting, which includes using a toilet, bedpan, or urinal? 2   Putting on and taking off regular upper body clothing? 2   Taking care of personal grooming such as brushing teeth? 3   Eating meals? 3   6 Clicks Daily Activity Score 14   Functional Mobility   Sit to Stand Maximal Assist   Bed, Chair, Wheelchair Transfer Unable to Participate   Mobility EOB>STS>BTB   Comments w/ FWW, pt w/ increased dizziness and slightly decreased responsiveness (still alert and communicating) after standing, assisted BTB   Patient / Family  Goals   Patient / Family Goal #1 to go home   Short Term Goals   Short Term Goal # 1 pt will demo stand pivot txf to BSC with Wanda   Short Term Goal # 2 pt will demo toileting with supv   Short Term Goal # 3 pt will groom seated at the sink w/ supv

## 2022-06-21 NOTE — PROGRESS NOTES
4 Eyes Skin Assessment Completed by ROBERT Tobar and ROBERT López.    Head x2 staples to posterior head, ABDIRAHMAN  Ears WDL  Nose WDL  Mouth WDL  Neck WDL  Breast/Chest WDL  Shoulder Blades WDL  Spine WDL  (R) Arm/Elbow/Hand Bruising and Abrasions  (L) Arm/Elbow/Hand Bruising  Abdomen WDL  Groin WDL  Scrotum/Coccyx/Buttocks non-blanching bruising to R buttock  (R) Leg Incision to hip with dressing in place  (L) Leg WDL  (R) Heel/Foot/Toe WDL  (L) Heel/Foot/Toe WDL          Devices In Places Pulse Ox, SCD's and Nasal Cannula, purewick      Interventions In Place Heel Mepilex, Waffle Overlay, Pillows, Q2 Turns, Dri-Bruno Pads and Pressure Redistribution Mattress    Possible Skin Injury No    Pictures Uploaded Into Epic N/A  Wound Consult Placed N/A  RN Wound Prevention Protocol Ordered No

## 2022-06-21 NOTE — HOSPITAL COURSE
This 86-year-old female with a past medical history significant for GERD presented to the ER on 6/20/2022 after having mechanical ground-level fall.      She is walking with the walker and fell and hit Right side along with head.  X-ray pelvis showing comminuted and foreshortened right femoral intratrochanteric fracture. CT head showed Comminuted, superiorly displaced intertrochanteric right proximal femoral fracture extending to the femoral neck.    Orthopedic surgery consulted and  she underwent Surgical treatment of right intertrochanteric femur fracture with intramedullary device.  Per Orthopedic, she should bear weight as tolerated on their operative extremity. PT and OT has evaluated and recs post-acute. SNF referral has been placed    Her hospital course complicated with acute blood loss anemia, status post PRBC transfusion on 6/22, repeat hemoglobin at 8.4.  Iron study was obtained, she is noted to have iron deficiency anemia.  Will provide IV iron.    Of note she was noted to have mild SALIMA on 6/22, improved, likely secondary to prerenal etiology.    At this time patient is medically stable to discharge risk nursing facility.  For all chronic medical condition, she will resume her home medication    Interval events:  -- No acute  events overnight, heart rate 61-85, blood pressure 102/45, saturating 96% on 0.5 L of oxygen.  Patient stated that her pain is well controlled.  Patient underwent surgery yesterday, PT/OT has evaluated the patient, recommended postacute, SNF referral has been placed.  --Her hemoglobin is noted to be 11.4, monitor, if less than 7, transfuse    Of care has been discussed the patient, nursing staff, case management.

## 2022-06-21 NOTE — THERAPY
Physical Therapy   Initial Evaluation     Patient Name: Amirah Crane  Age:  86 y.o., Sex:  female  Medical Record #: [id number]  Today's Date: 6/21/2022     Precautions  Precautions: Fall Risk;Weight Bearing As Tolerated Right Lower Extremity    Assessment  Patient is 86 y.o. female admitted post GLF with R femur fracture now POD #1 IMN. PT evaluation limited today due to dizziness EOB. Pt required max assist with bed mob and STS. PT will cont while in acute care setting to address pain, balance, ROM, strength, activity tolerance, and mobility.     Plan    Recommend Physical Therapy 4 times per week until therapy goals are met for the following treatments:  Bed Mobility, Gait Training, Neuro Re-Education / Balance, Self Care/Home Evaluation, Therapeutic Activities and Therapeutic Exercises    DC Equipment Recommendations: Unable to determine at this time  Discharge Recommendations: Recommend post-acute placement for additional physical therapy services prior to discharge home          06/21/22 1051   Prior Living Situation   Prior Services Intermittent Physical Support for ADL Per Service;Housekeeping / Homemaker Services   Housing / Facility Assisted Living Residence   Steps Into Home 0   Steps In Home 0   Bathroom Set up Walk In Shower;Grab Bars;Shower Chair   Equipment Owned 4-Wheel Walker;Front-Wheel Walker;Hospital Bed   Lives with - Patient's Self Care Capacity Alone and Unable to Care For Self   Comments pt lives at [facility], she has a button she can press when she needs help   Prior Level of Functional Mobility   Bed Mobility Independent   Transfer Status Independent   Ambulation Independent   Distance Ambulation (Feet)   (short household)   Assistive Devices Used 4-Wheel Walker   Comments independent in her room with basic mobility   History of Falls   History of Falls Yes   Cognition    Cognition / Consciousness X   Level of Consciousness Alert   Comments lethargic throghout but cooperative    Active ROM Lower Body    Active ROM Lower Body  X   Comments R LE limited by pain   Strength Lower Body   Lower Body Strength  X   Comments generally weak, R LE weaker than L due to post op pain   Sensation Lower Body   Lower Extremity Sensation   WDL   Balance Assessment   Sitting Balance (Static) Fair   Sitting Balance (Dynamic) Fair -   Standing Balance (Static) Trace +   Standing Balance (Dynamic) Trace   Weight Shift Sitting Poor   Weight Shift Standing Poor   Comments FWW   Gait Analysis   Gait Level Of Assist Unable to Participate   Weight Bearing Status WBAT R LE   Bed Mobility    Supine to Sit Moderate Assist   Sit to Supine Maximal Assist   Scooting Maximal Assist   Functional Mobility   Sit to Stand Maximal Assist   Bed, Chair, Wheelchair Transfer Unable to Participate   Mobility EOB, STS   Short Term Goals    Short Term Goal # 1 pt will be able to complete supine<>sitting with bed controls and min assist in 6tx in order to progress home   Short Term Goal # 2 pt will be able to complete functional transfers with FWW and min assist in 6tx in order to progress home   Short Term Goal # 3 pt will be able to ambulate 50ft with FWW and min assist in 6tx in order to progress home   Education Group   Education Provided Role of Physical Therapist;Weight Bearing Status   Role of Physical Therapist Patient Response Patient;Family;Acceptance;Explanation;Demonstration;Verbal Demonstration;Action Demonstration   Weight Bearing Status Patient Response Patient;Family;Acceptance;Explanation;Demonstration;Verbal Demonstration;Action Demonstration   Anticipated Discharge Equipment and Recommendations   DC Equipment Recommendations Unable to determine at this time   Discharge Recommendations Recommend post-acute placement for additional physical therapy services prior to discharge home

## 2022-06-21 NOTE — PROGRESS NOTES
[facility] Medicine Daily Progress Note    Date of Service  6/21/2022    Chief Complaint  Amirah Crane is a 86 y.o. female admitted 6/20/2022 with   Chief Complaint   Patient presents with   • GLF     Pt BIBA as TBI after losing balance while walking with her walker and fell and hit her posterior head. -LOC, -thinners, -c-spine tenderness. Pt reporting R sided hip pain.          Hospital Course  This 86-year-old female with a past medical history significant for GERD presented to the ER on 6/20/2022 after having mechanical ground-level fall.    She is walking with the walker and fell and hit Right side along with head.  X-ray pelvis showing comminuted and foreshortened right femoral intratrochanteric fracture. CT head showed Comminuted, superiorly displaced intertrochanteric right proximal femoral fracture extending to the femoral neck.    Orthopedic surgery consulted and  she underwent Surgical treatment of right intertrochanteric femur fracture with intramedullary device.  Per Orthopedic,  The patient should bear weight as tolerated on their operative extremity. PT and OT has evaluated and recs post-acute. SNF referral has been placed    Interval events:  -- No acute  events overnight, heart rate 61-85, blood pressure 102/45, saturating 96% on 0.5 L of oxygen.  Patient stated that her pain is well controlled.  Patient underwent surgery yesterday, PT/OT has evaluated the patient, recommended postacute, SNF referral has been placed.  --Her hemoglobin is noted to be 11.4, monitor, if less than 7, transfuse    Of care has been discussed the patient, nursing staff, case management.    I have discussed this patient's plan of care and discharge plan at IDT rounds today with Case Management, Nursing, Nursing leadership, and other members of the IDT team.    Consultants/Specialty  orthopedics    Code Status  DNAR, I OK    Disposition  Patient is not medically cleared for discharge.   Anticipate discharge to to home  with close outpatient follow-up.  I have placed the appropriate orders for post-discharge needs.    Review of Systems  Review of Systems   Constitutional: Negative for fever and malaise/fatigue.   HENT: Negative for congestion and sore throat.    Eyes: Negative for blurred vision and double vision.   Respiratory: Negative for cough, hemoptysis, sputum production and shortness of breath.    Cardiovascular: Negative for chest pain, palpitations, orthopnea and claudication.   Gastrointestinal: Negative for diarrhea and heartburn.   Genitourinary: Negative for dysuria.   Musculoskeletal: Positive for joint pain and myalgias.   Neurological: Negative for headaches.   Psychiatric/Behavioral: The patient is nervous/anxious.    All other systems reviewed and are negative.       Physical Exam  Temp:  [36.2 °C (97.1 °F)-37.1 °C (98.7 °F)] 36.9 °C (98.5 °F)  Pulse:  [55-85] 61  Resp:  [9-18] 17  BP: (102-201)/(45-89) 102/45  SpO2:  [93 %-100 %] 95 %    Physical Exam  Vitals and nursing note reviewed.   Constitutional:       Appearance: Normal appearance.   HENT:      Head: Normocephalic and atraumatic.   Eyes:      Extraocular Movements: Extraocular movements intact.   Cardiovascular:      Rate and Rhythm: Tachycardia present.      Heart sounds: Normal heart sounds.   Pulmonary:      Breath sounds: Rales present.   Abdominal:      General: There is no distension.      Palpations: Abdomen is soft.      Tenderness: There is no abdominal tenderness.   Musculoskeletal:      Cervical back: Neck supple.      Comments: decreased ROM 2/2 pain   Skin:     General: Skin is warm.   Neurological:      Mental Status: She is alert and oriented to person, place, and time.      Cranial Nerves: No cranial nerve deficit.   Psychiatric:         Mood and Affect: Mood normal.         Fluids    Intake/Output Summary (Last 24 hours) at 6/21/2022 1357  Last data filed at 6/21/2022 0742  Gross per 24 hour   Intake 1420 ml   Output 175 ml   Net 1245 ml        Laboratory  Recent Labs     06/20/22  1231   WBC 9.0   RBC 4.12*   HEMOGLOBIN 11.4*   HEMATOCRIT 35.2*   MCV 85.4   MCH 27.7   MCHC 32.4*   RDW 57.4*   PLATELETCT 283   MPV 10.3     Recent Labs     06/20/22  1231   SODIUM 135   POTASSIUM 4.3   CHLORIDE 102   CO2 22   GLUCOSE 103*   BUN 26*   CREATININE 0.75   CALCIUM 8.9     Recent Labs     06/20/22  1231   INR 1.02               Imaging  DX-PORTABLE FLUOROSCOPY < 1 HOUR   Final Result      Portable fluoroscopy utilized for 48 seconds.         INTERPRETING LOCATION: [address] [place] [address], [place], [address]      DX-HIP-UNILATERAL-W/O PELVIS-2/3 VIEWS RIGHT   Final Result         1.  Intraoperative changes of right femoral intramedullary nailing with dynamic hip screw in progress      CT-HIP W/O PLUS RECONS RIGHT   Final Result      1.  Comminuted, superiorly displaced intertrochanteric right proximal femoral fracture extending to the femoral neck.   2.  Postsurgical changes of prior left proximal femoral ORIF with nonunited fracture of the subtrochanteric proximal left femur.   3.  Remote deformity of the left pubic body/inferior pubic ramus. Acute on chronic fracture cannot be entirely excluded.   4.  No other evidence of acute pelvic fracture.      DX-FEMUR-2+ RIGHT   Final Result      1.  Comminuted and foreshortened right femoral intertrochanteric fracture.   2.  Remote left pubic body deformity.      DX-CHEST-PORTABLE (1 VIEW)   Final Result      1.  Markedly rotated exam. No definite acute cardiac or pulmonary abnormalities are identified.      DX-PELVIS-1 OR 2 VIEWS   Final Result      1.  Comminuted and foreshortened right femoral intertrochanteric fracture.   2.  Cortical disruption of the iliopubic line on the right concerning for nondisplaced fracture.   3.  Prior ORIF left femur with nonunited fracture of the proximal left femur.   4.  Remote left pubic body deformity.      CT-HEAD W/O   Final Result      1. Cerebral atrophy.   2. White matter lucencies most  consistent with small vessel ischemic change versus demyelination or gliosis.   3. Otherwise, Head CT without contrast with no acute findings. No evidence of acute cerebral infarction, hemorrhage or mass lesion.   4. Soft tissue injury of the posterior right scalp.   5. Likely inspissated secretions in the right sphenoid sinus.      CT-CSPINE WITHOUT PLUS RECONS   Final Result      Degenerative changes of the cervical spine without acute fracture or malalignment.           Assessment/Plan  * Closed intertrochanteric fracture of right hip, initial encounter (HCC)- (present on admission)  Assessment & Plan  Admit patient to orthopedic floor   --Orthopedic surgery consulted, patient underwent Surgical treatment of right intertrochanteric femur fracture with intramedullary device  -- Obtain PT and oT  dvt ppx   Follow ortho recs    Anemia  Assessment & Plan  Monitor H&H   If less than 7 , transfuse    Advanced care planning/counseling discussion  Assessment & Plan  Goals of care discussed with patient, daughter (Reena ) who is power of  at bedside.  They state that patient does not want any chest compressions if she has cardiac arrest, however is okay with intubation.    Palpitation- (present on admission)  Assessment & Plan  Continue home dose metoprolol   -- bp well controlled       VTE prophylaxis: SCDs/TEDs and enoxaparin ppx

## 2022-06-21 NOTE — DISCHARGE PLANNING
Met with patient and her son at bedside, provided snf choices. Daughter Reena is out of town and son is primary contact. [facility] is preferred snf. CM will follow up tomorrow.

## 2022-06-21 NOTE — ANESTHESIA POSTPROCEDURE EVALUATION
Patient: Amirah Crane    Procedure Summary     Date: 06/20/22 Room / Location: [place] [address] / SURGERY [facility]    Anesthesia Start: 2106 Anesthesia Stop: 2157    Procedure: SURGICAL TREATMENT OF RIGHT INTERTROCHANTERIC FEMUR FRACTURE WITH INTRAMEDULLARY DEVICE (Right Hip) Diagnosis: (Right intertrochanteric femur fracture)    Surgeons: Alberto Arndt M.D. Responsible Provider: Umer Valerio M.D.    Anesthesia Type: general ASA Status: 3          Final Anesthesia Type: general  Last vitals  BP   Blood Pressure : (!) 151/67    Temp   36.3 °C (97.3 °F)    Pulse   60   Resp   12    SpO2   98 %      Anesthesia Post Evaluation    Patient location during evaluation: PACU  Patient participation: complete - patient participated  Level of consciousness: awake and alert    Airway patency: patent  Anesthetic complications: no  Cardiovascular status: hemodynamically stable  Respiratory status: acceptable  Hydration status: euvolemic    PONV: none          No complications documented.     Nurse Pain Score: 5 (NPRS)

## 2022-06-21 NOTE — OR NURSING
Pt and family updated regarding delay in surgery start time.  Call light within reach, will monitor.

## 2022-06-21 NOTE — CARE PLAN
The patient is Watcher - Medium risk of patient condition declining or worsening         Progress made toward(s) clinical / shift goals:  pain control    Patient is not progressing towards the following goals:      Problem: Pain - Standard  Goal: Alleviation of pain or a reduction in pain to the patient’s comfort goal  Outcome: Not Met     Problem: Knowledge Deficit - Standard  Goal: Patient and family/care givers will demonstrate understanding of plan of care, disease process/condition, diagnostic tests and medications  Outcome: Not Met

## 2022-06-21 NOTE — OR NURSING
Pt's VSS. Pt's right leg dressing is clean, dry, and intact. Pt denies nausea and pain. Pt to be transported to room.

## 2022-06-21 NOTE — ANESTHESIA TIME REPORT
Anesthesia Start and Stop Event Times     Date Time Event    6/20/2022 1811 Ready for Procedure     2106 Anesthesia Start     2157 Anesthesia Stop        Responsible Staff  06/20/22    Name Role Begin End    Umer Valerio M.D. Anesth 2106 2157        Overtime Reason:  no overtime (within assigned shift)    Comments:

## 2022-06-21 NOTE — PROGRESS NOTES
"   Orthopaedic Progress Note    Interval changes:  Patient doing well post op  Dressings CDI  Cleared for DC to SNF by ortho pending medicine clearance    ROS - Patient denies any new issues.  Pain well controlled.    /45   Pulse 61   Temp 36.9 °C (98.5 °F) (Temporal)   Resp 17   Ht 1.6 m (5' 3\")   Wt 50.1 kg (110 lb 7.2 oz)   SpO2 95%       Patient seen and examined  No acute distress  Breathing non labored  RRR  RLE surgical dressings are clean, dry, and intact. Patient clearly fires tibialis anterior, EHL, and gastrocnemius/soleus. Sensation is intact to light touch throughout superficial peroneal, deep peroneal, tibial, saphenous, and sural nerve distributions. Strong and palpable 2+ dorsalis pedis and posterior tibial pulses with capillary refill less than 2 seconds. No lower leg tenderness or discomfort.       Recent Labs     06/20/22  1231   WBC 9.0   RBC 4.12*   HEMOGLOBIN 11.4*   HEMATOCRIT 35.2*   MCV 85.4   MCH 27.7   MCHC 32.4*   RDW 57.4*   PLATELETCT 283   MPV 10.3       Active Hospital Problems    Diagnosis    • Anemia [D64.9]    • Closed intertrochanteric fracture of right hip, initial encounter ([facility]) [S72.141A]    • Advanced care planning/counseling discussion [Z71.89]    • Palpitation [R00.2]        Assessment/Plan:  Patient doing well post op  Dressings CDI  Cleared for DC to SNF by ortho pending medicine clearance  POD#1 S/P Surgical treatment of right intertrochanteric femur fracture with intramedullary device  Wt bearing status - WBAT  Wound care/Drains - Dressings to be changed every other day by nursing  Future Procedures - none planned   Lovenox: Start 6/21, Duration-until ambulatory > 150'  Sutures/Staples out- 14 days post operatively  PT/OT-initiated  Antibiotics: Perioperative completed  DVT Prophylaxis- TEDS/SCDs/Foot pumps  Morgan-none  Case Coordination for Discharge Planning - Disposition SNF    "

## 2022-06-21 NOTE — ED NOTES
Med rec updated and complete. Allergies reviewed. Placed call to [facility] assisted living to verify current medications and last doses taken. Unable to locate paperwork sent with pt.      [facility] denies antibiotic use in last 30 days      Home pharmacy [facility]  [phone]

## 2022-06-21 NOTE — OP REPORT
DATE OF OPERATION: 6/20/2022     PREOPERATIVE DIAGNOSIS: Right intertrochanteric femur fracture    POSTOPERATIVE DIAGNOSIS: Same    PROCEDURE PERFORMED: Surgical treatment of right intertrochanteric femur fracture with intramedullary device    SURGEON: Alberto Arndt M.D.     ASSISTANT: None    ANESTHESIA: General    ESTIMATED BLOOD LOSS: 0 mL    INDICATIONS: The patient is a 86 y.o. female with a right intertrochanteric femur fracture resulting from a ground level fall.  The patient denies antecedent pain, and was found to have a normal neurovascular exam and skin envelope.  Radiographs reviewed by myself demonstrated the intertrochanteric femur fracture.  Given these findings, surgical treatment of the intertrochanteric fracture with an intramedullary device was indicated.  I discussed the risks and benefits of the procedure, including the risks of infection, wound healing complication, neurovascular injury, persistent hip pain, malunion, non-union, malrotation, and the medical risks of anesthesia including DVT, PE, MI, stroke, and death.  Benefits include early mobilization, improved chance of union, and reduction in the medical risks of hip fractures.  Alternatives to surgery were also discussed, including non-operative management.  The patient signed the informed consent and the operative extremity was marked.      PROCEDURE:  The patient underwent anesthesia, and was positioned supine on the fracture table and all bony prominences were well padded.  Preoperative antibiotics were administered. Sequential compression devices were employed.  Preoperative imaging was obtained, demonstrated reduction of the fracture using the table. The correct operative site was prepped and draped into a sterile field. A procedural pause was conducted to verify correct patient, correct extremity, presence of the surgeons initials on the operative extremity.    The guide pin for the OIC hip nail was placed into the tip of the  greater trochanter and advanced under fluoroscopic guidance to the appropriate position. An incision was made around the pin, and the entry reamer was then used to gain access to the femoral canal.  The guide pin was then removed.    A long ball-tip guide wire was advanced down the femur to the knee, its position confirmed on fluoroscopy.  We then measured for, and selected a 210 x 11 x 127.5 OIC hip nail.  We then sequentially reamed to a size 12.5 mm reamer, and advanced the nail over the guide pin to an appropriate depth, confirmed by fluoroscopy.    The soft tissue sleeve for the lag screw was then advanced down to bone through a lateral thigh incision.  The guide pin was advanced to a central position within the femoral neck/head, confirmed by fluoroscopy.  We measured our lag screw and reamed for our lag screw.  We then placed the lag screw by hand.  The fracture was compressed, and the set screw was locked proximally.  All instruments were removed from the proximal femur, and final fluoroscopic images obtained.    We then obtained perfect [place] imaging distally, and placed one statically interlocked distal screw, obtaining good bicortical purchase.    All wounds were irrigated  with normal saline, and closed in layers, with 2-0 Vicryl in the subcutaneous tissue, and staples in the skin.  Sterile dressings were applied.       The patient tolerated the procedure well. There were no apparent complications. All sponge, needle, and instrument counts were correct on two separate occasions. She was awakened, extubated, and transferred to the recovery room in satisfactory condition.     Post-Operative Plan:    1.  The patient should bear weight as tolerated on their operative extremity.  Gait aids (crutch or crutches, cane, walker) may be used as needed, and may be discontinued when no longer required.  2.  IV antibiotics - may be continued for 24 hours, but are not required.  3.  DVT prophylaxis - SCD's and Lovenox  40 mg SQ daily while inpatient.  The patient may transition to Aspirin 325 mg PO BID as an outpatient  4.  Discharge planning  ____________________________________   Alberto Arndt M.D.   DD: 6/20/2022  9:43 PM

## 2022-06-21 NOTE — DISCHARGE PLANNING
Care Transition Team Assessment    Information Source  Orientation Level: Oriented X4    Elopement Risk  Legal Hold: No  Ambulatory or Self Mobile in Wheelchair: No-Not an Elopement Risk  Elopement Risk: Not at Risk for Elopement    Interdisciplinary Discharge Planning  Lives at [facility] assisted living,  Patient or legal guardian wants to designate a caregiver: No  Support Systems: Home Care Staff  Housing / Facility: 1 Story Apartment  Name of Care Facility: [place], [place],  [address], [place] [address]  Tel: [phone].      Do You Take your Prescribed Medications Regularly: Yes  Able to Return to Previous ADL's: Future Time w/Therapy  Mobility Issues: Yes  Prior Services: [facility] for PT/RN.   Assistance Needed: Yes  Durable Medical Equipment: Walker, 4ww, shower chair, hsp bed, grab bars.     Discharge Preparedness  What is your plan after discharge?: Skilled nursing facility  What are your discharge supports?: Daughter Reena.   Prior Functional Level: Needs Assist with bathing, dressing, laundry, medication, cleaning, shopping.   Difficulity with ADLs: Bathing, Dressing.   Difficulity with IADLs: Laundry, Cooking, Driving, Shopping.     Functional Assesment  Prior Functional Level: Needs Assist with Activities of Daily Living     Vision / Hearing Impairment  Vision Impairment : No  Right Eye Vision: Wears Glasses  Left Eye Vision: Wears Glasses  Hearing Impairment : No    Domestic Abuse  Have you ever been the victim of abuse or violence?: No  Physical Abuse or Sexual Abuse: No  Verbal Abuse or Emotional Abuse: No  Possible Abuse/Neglect Reported to:: Not Applicable  Discharge Risks or Barriers  Discharge risks or barriers?: No    Anticipated Discharge Information  Discharge Disposition: D/T to SNF with Medicare cert in anticipation of skilled care (03)

## 2022-06-21 NOTE — DISCHARGE PLANNING
Agency/Facility Name: [facility]  Spoke To: Dwayne   Outcome: Pt is on service with [facility].

## 2022-06-22 LAB
ABO + RH BLD: NORMAL
ABO GROUP BLD: NORMAL
ALBUMIN SERPL BCP-MCNC: 2.5 G/DL (ref 3.2–4.9)
ANION GAP SERPL CALC-SCNC: 7 MMOL/L (ref 7–16)
BARCODED ABORH UBTYP: 5100
BARCODED PRD CODE UBPRD: NORMAL
BARCODED UNIT NUM UBUNT: NORMAL
BASOPHILS # BLD AUTO: 0.2 % (ref 0–1.8)
BASOPHILS # BLD: 0.02 K/UL (ref 0–0.12)
BLD GP AB SCN SERPL QL: NORMAL
BUN SERPL-MCNC: 36 MG/DL (ref 8–22)
CALCIUM SERPL-MCNC: 8.2 MG/DL (ref 8.5–10.5)
CHLORIDE SERPL-SCNC: 105 MMOL/L (ref 96–112)
CO2 SERPL-SCNC: 23 MMOL/L (ref 20–33)
COMPONENT R 8504R: NORMAL
CREAT SERPL-MCNC: 1.2 MG/DL (ref 0.5–1.4)
EOSINOPHIL # BLD AUTO: 0.08 K/UL (ref 0–0.51)
EOSINOPHIL NFR BLD: 0.8 % (ref 0–6.9)
ERYTHROCYTE [DISTWIDTH] IN BLOOD BY AUTOMATED COUNT: 58 FL (ref 35.9–50)
ERYTHROCYTE [DISTWIDTH] IN BLOOD BY AUTOMATED COUNT: 59.3 FL (ref 35.9–50)
FERRITIN SERPL-MCNC: 158 NG/ML (ref 10–291)
GFR SERPLBLD CREATININE-BSD FMLA CKD-EPI: 44 ML/MIN/1.73 M 2
GLUCOSE SERPL-MCNC: 120 MG/DL (ref 65–99)
HCT VFR BLD AUTO: 19.9 % (ref 37–47)
HCT VFR BLD AUTO: 21.1 % (ref 37–47)
HGB BLD-MCNC: 6.4 G/DL (ref 12–16)
HGB BLD-MCNC: 6.8 G/DL (ref 12–16)
IMM GRANULOCYTES # BLD AUTO: 0.06 K/UL (ref 0–0.11)
IMM GRANULOCYTES NFR BLD AUTO: 0.6 % (ref 0–0.9)
IRON SATN MFR SERPL: 8 % (ref 15–55)
IRON SERPL-MCNC: 14 UG/DL (ref 40–170)
LYMPHOCYTES # BLD AUTO: 2.34 K/UL (ref 1–4.8)
LYMPHOCYTES NFR BLD: 23.9 % (ref 22–41)
MCH RBC QN AUTO: 27.7 PG (ref 27–33)
MCH RBC QN AUTO: 28.1 PG (ref 27–33)
MCHC RBC AUTO-ENTMCNC: 32.2 G/DL (ref 33.6–35)
MCHC RBC AUTO-ENTMCNC: 32.2 G/DL (ref 33.6–35)
MCV RBC AUTO: 86.1 FL (ref 81.4–97.8)
MCV RBC AUTO: 87.2 FL (ref 81.4–97.8)
MONOCYTES # BLD AUTO: 1.38 K/UL (ref 0–0.85)
MONOCYTES NFR BLD AUTO: 14.1 % (ref 0–13.4)
NEUTROPHILS # BLD AUTO: 5.9 K/UL (ref 2–7.15)
NEUTROPHILS NFR BLD: 60.4 % (ref 44–72)
NRBC # BLD AUTO: 0 K/UL
NRBC BLD-RTO: 0 /100 WBC
PHOSPHATE SERPL-MCNC: 3.2 MG/DL (ref 2.5–4.5)
PLATELET # BLD AUTO: 171 K/UL (ref 164–446)
PLATELET # BLD AUTO: 181 K/UL (ref 164–446)
PMV BLD AUTO: 10.4 FL (ref 9–12.9)
PMV BLD AUTO: 10.4 FL (ref 9–12.9)
POTASSIUM SERPL-SCNC: 4.6 MMOL/L (ref 3.6–5.5)
PRODUCT TYPE UPROD: NORMAL
RBC # BLD AUTO: 2.31 M/UL (ref 4.2–5.4)
RBC # BLD AUTO: 2.42 M/UL (ref 4.2–5.4)
RH BLD: NORMAL
SODIUM SERPL-SCNC: 135 MMOL/L (ref 135–145)
TIBC SERPL-MCNC: 185 UG/DL (ref 250–450)
TSH SERPL DL<=0.005 MIU/L-ACNC: 3.71 UIU/ML (ref 0.38–5.33)
UIBC SERPL-MCNC: 171 UG/DL (ref 110–370)
UNIT STATUS USTAT: NORMAL
WBC # BLD AUTO: 8.7 K/UL (ref 4.8–10.8)
WBC # BLD AUTO: 9.8 K/UL (ref 4.8–10.8)

## 2022-06-22 PROCEDURE — 86901 BLOOD TYPING SEROLOGIC RH(D): CPT

## 2022-06-22 PROCEDURE — 36430 TRANSFUSION BLD/BLD COMPNT: CPT

## 2022-06-22 PROCEDURE — 700102 HCHG RX REV CODE 250 W/ 637 OVERRIDE(OP): Performed by: STUDENT IN AN ORGANIZED HEALTH CARE EDUCATION/TRAINING PROGRAM

## 2022-06-22 PROCEDURE — 86850 RBC ANTIBODY SCREEN: CPT

## 2022-06-22 PROCEDURE — 700105 HCHG RX REV CODE 258

## 2022-06-22 PROCEDURE — 86923 COMPATIBILITY TEST ELECTRIC: CPT

## 2022-06-22 PROCEDURE — A9270 NON-COVERED ITEM OR SERVICE: HCPCS | Performed by: STUDENT IN AN ORGANIZED HEALTH CARE EDUCATION/TRAINING PROGRAM

## 2022-06-22 PROCEDURE — 30233N1 TRANSFUSION OF NONAUTOLOGOUS RED BLOOD CELLS INTO PERIPHERAL VEIN, PERCUTANEOUS APPROACH: ICD-10-PCS | Performed by: HOSPITALIST

## 2022-06-22 PROCEDURE — 700111 HCHG RX REV CODE 636 W/ 250 OVERRIDE (IP): Performed by: ORTHOPAEDIC SURGERY

## 2022-06-22 PROCEDURE — 85025 COMPLETE CBC W/AUTO DIFF WBC: CPT

## 2022-06-22 PROCEDURE — 700102 HCHG RX REV CODE 250 W/ 637 OVERRIDE(OP): Performed by: ORTHOPAEDIC SURGERY

## 2022-06-22 PROCEDURE — 83550 IRON BINDING TEST: CPT

## 2022-06-22 PROCEDURE — 36415 COLL VENOUS BLD VENIPUNCTURE: CPT

## 2022-06-22 PROCEDURE — 99233 SBSQ HOSP IP/OBS HIGH 50: CPT | Performed by: HOSPITALIST

## 2022-06-22 PROCEDURE — 770001 HCHG ROOM/CARE - MED/SURG/GYN PRIV*

## 2022-06-22 PROCEDURE — A9270 NON-COVERED ITEM OR SERVICE: HCPCS | Performed by: ORTHOPAEDIC SURGERY

## 2022-06-22 PROCEDURE — 80069 RENAL FUNCTION PANEL: CPT

## 2022-06-22 PROCEDURE — 83540 ASSAY OF IRON: CPT

## 2022-06-22 PROCEDURE — P9016 RBC LEUKOCYTES REDUCED: HCPCS

## 2022-06-22 PROCEDURE — 86900 BLOOD TYPING SEROLOGIC ABO: CPT

## 2022-06-22 PROCEDURE — 700105 HCHG RX REV CODE 258: Performed by: HOSPITALIST

## 2022-06-22 PROCEDURE — 700111 HCHG RX REV CODE 636 W/ 250 OVERRIDE (IP): Performed by: HOSPITALIST

## 2022-06-22 PROCEDURE — 85027 COMPLETE CBC AUTOMATED: CPT

## 2022-06-22 RX ORDER — SODIUM CHLORIDE 9 MG/ML
INJECTION, SOLUTION INTRAVENOUS CONTINUOUS
Status: DISCONTINUED | OUTPATIENT
Start: 2022-06-22 | End: 2022-06-22

## 2022-06-22 RX ADMIN — ACETAMINOPHEN 650 MG: 325 TABLET ORAL at 22:19

## 2022-06-22 RX ADMIN — DOCUSATE SODIUM 100 MG: 100 CAPSULE, LIQUID FILLED ORAL at 16:02

## 2022-06-22 RX ADMIN — FLUOXETINE 40 MG: 20 CAPSULE ORAL at 06:05

## 2022-06-22 RX ADMIN — ACETAMINOPHEN 650 MG: 325 TABLET ORAL at 02:38

## 2022-06-22 RX ADMIN — KETOROLAC TROMETHAMINE 15 MG: 30 INJECTION, SOLUTION INTRAMUSCULAR; INTRAVENOUS at 10:30

## 2022-06-22 RX ADMIN — DOCUSATE SODIUM 100 MG: 100 CAPSULE, LIQUID FILLED ORAL at 06:05

## 2022-06-22 RX ADMIN — KETOROLAC TROMETHAMINE 15 MG: 30 INJECTION, SOLUTION INTRAMUSCULAR; INTRAVENOUS at 02:37

## 2022-06-22 RX ADMIN — SODIUM CHLORIDE 125 MG: 9 INJECTION, SOLUTION INTRAVENOUS at 18:00

## 2022-06-22 RX ADMIN — METOPROLOL SUCCINATE 37.5 MG: 25 TABLET, EXTENDED RELEASE ORAL at 06:05

## 2022-06-22 RX ADMIN — SENNOSIDES AND DOCUSATE SODIUM 1 TABLET: 50; 8.6 TABLET ORAL at 20:45

## 2022-06-22 RX ADMIN — SODIUM CHLORIDE: 9 INJECTION, SOLUTION INTRAVENOUS at 03:30

## 2022-06-22 RX ADMIN — ACETAMINOPHEN 650 MG: 325 TABLET ORAL at 10:30

## 2022-06-22 ASSESSMENT — ENCOUNTER SYMPTOMS
SPUTUM PRODUCTION: 0
MYALGIAS: 1
EYE PAIN: 0
SORE THROAT: 0
FEVER: 0
SHORTNESS OF BREATH: 0
HEADACHES: 0
HEMOPTYSIS: 0
VOMITING: 0
COUGH: 0
DIARRHEA: 0
CLAUDICATION: 0
NERVOUS/ANXIOUS: 1
NAUSEA: 0
BLURRED VISION: 0

## 2022-06-22 ASSESSMENT — PAIN DESCRIPTION - PAIN TYPE
TYPE: ACUTE PAIN;SURGICAL PAIN

## 2022-06-22 NOTE — PROGRESS NOTES
Patient stated to this RN that she would now like to receive the blood transfusion. Provider notified.

## 2022-06-22 NOTE — PROGRESS NOTES
0200 .Lida from Lab called with critical result of Hematcrit at 19.9 and Hemoglobin at 6.4. Critical lab result read back to Lida.   Dr. Davidson notified of critical lab result at 0214.  Critical lab result read back by Dr. Davidson. Orders placed for a redraw. Pending results. Will continue to monitor  Redraw results came back with values of 6.8 for hemoglobin and 21.1 for hematcrit.    0330 Orders in to go forward with blood transfusion and obtain fecal occult sample. Attempted to obtain blood consent form but the patient is refusing at this time. She requested to speak with her son Raudel on the phone to discuss the matter. I called Rauedl and educated both him and the patient on the benefits of the transfusion and why it was requested. The patient is continuing to refuse to transfusion. Raudel will come in the morning to go over this further. Will continue to monitor.

## 2022-06-22 NOTE — PROGRESS NOTES
"   Orthopaedic Progress Note    Interval changes:  Patient doing well   Anemic- patient unsure about transfusion this am  Dressings CDI    ROS - Patient denies any new issues.  Pain well controlled.    /64   Pulse 69   Temp 36.8 °C (98.2 °F) (Temporal)   Resp 17   Ht 1.6 m (5' 3\")   Wt 50.1 kg (110 lb 7.2 oz)   SpO2 100%       Patient seen and examined  No acute distress  Breathing non labored  RRR  RLE surgical dressings are clean, dry, and intact. Patient clearly fires tibialis anterior, EHL, and gastrocnemius/soleus. Sensation is intact to light touch throughout superficial peroneal, deep peroneal, tibial, saphenous, and sural nerve distributions. Strong and palpable 2+ dorsalis pedis and posterior tibial pulses with capillary refill less than 2 seconds. No lower leg tenderness or discomfort.       Recent Labs     06/20/22  1231 06/22/22  0115 06/22/22  0231   WBC 9.0 8.7 9.8   RBC 4.12* 2.31* 2.42*   HEMOGLOBIN 11.4* 6.4* 6.8*   HEMATOCRIT 35.2* 19.9* 21.1*   MCV 85.4 86.1 87.2   MCH 27.7 27.7 28.1   MCHC 32.4* 32.2* 32.2*   RDW 57.4* 58.0* 59.3*   PLATELETCT 283 171 181   MPV 10.3 10.4 10.4       Active Hospital Problems    Diagnosis    • Anemia [D64.9]    • Closed intertrochanteric fracture of right hip, initial encounter ([facility]) [S72.141A]    • Advanced care planning/counseling discussion [Z71.89]    • Palpitation [R00.2]        Assessment/Plan:  Patient doing well   Anemic- patient unsure about transfusion this am  Dressings CDI  POD#2 S/P Surgical treatment of right intertrochanteric femur fracture with intramedullary device  Wt bearing status - WBAT  Wound care/Drains - Dressings to be changed every other day by nursing  Future Procedures - none planned   Lovenox: Start 6/21, Duration-until ambulatory > 150'  Sutures/Staples out- 14 days post operatively  PT/OT-initiated  Antibiotics: Perioperative completed  DVT Prophylaxis- TEDS/SCDs/Foot pumps  Morgan-none  Case Coordination for Discharge " Planning - Disposition SNF

## 2022-06-22 NOTE — PROGRESS NOTES
NOC HOSPITALIST CROSS COVER    Notified by RN regarding hemoglobin drop from 11.4 to 6.4. Repeat hemoglobin draw was 6.8. The patient's vitals are stable and she has no acute signs of bleeding.         A/P:  #Anemia  -Transfuse 1 unit PRBC once now  -COD once  -Consent for blood  -Repeat CBC per hospital policy post transfusion  -Occult stool         -----------------------------------------------------------------------------------------------------------    Electronically signed by:  VICTORIANO Amaya, AGACNP-BC

## 2022-06-22 NOTE — CARE PLAN
The patient is Stable - Low risk of patient condition declining or worsening    Shift Goals  Clinical Goals: pain control, nutrition, mobility, safety, discharge planning,  Patient Goals: pain control, comfort, rest  Family Goals: not present at this time    Progress made toward(s) clinical / shift goals:      Problem: Pain - Standard  Goal: Alleviation of pain or a reduction in pain to the patient’s comfort goal  Outcome: Progressing  Note: Pt states pain is being managed by current pain medication regimen. Medications administered per MAR, ice pack applied, pillows for comfort and repositioning.       Problem: Fall Risk  Goal: Patient will remain free from falls  Outcome: Progressing  Note: Bed is locked and in lowest position, treaded socks on, bed alarm on, DME out of sight, call light and belongings within reach, pt calls appropriately for assistance, hourly rounding in place.

## 2022-06-22 NOTE — DISCHARGE PLANNING
Agency/Facility Name: [facility]  Spoke To: Dwayne  Outcome: Pt is currently on service with [facility] agency services.

## 2022-06-22 NOTE — PROGRESS NOTES
4 Eyes Skin Assessment Completed by ROBERT Rendon and ROBERT Park.     Head x2 staples to posterior head, ABDIRAHMAN  Ears WDL  Nose WDL  Mouth WDL  Neck WDL  Breast/Chest WDL  Shoulder Blades WDL  Spine WDL  (R) Arm/Elbow/Hand Bruising and Abrasions  (L) Arm/Elbow/Hand Bruising  Abdomen WDL  Groin WDL  Scrotum/Coccyx/Buttocks non-blanching bruising to R buttock  (R) Leg Incision to hip with dressing in place  (L) Leg WDL  (R) Heel/Foot/Toe Blanchable redness  (L) Heel/Foot/Toe Blanchable redness              Devices In Places Pulse Ox, SCD's and Nasal Cannula, purewick        Interventions In Place Heel Mepilex, Waffle Overlay, Pillows, Q2 Turns, Dri-Bruno Pads and Pressure Redistribution Mattress     Possible Skin Injury No     Pictures Uploaded Into Epic N/A  Wound Consult Placed N/A  RN Wound Prevention Protocol Ordered No

## 2022-06-22 NOTE — CARE PLAN
Problem: Pain - Standard  Goal: Alleviation of pain or a reduction in pain to the patient’s comfort goal  Outcome: Progressing     Problem: Fall Risk  Goal: Patient will remain free from falls  Outcome: Progressing   The patient is Stable - Low risk of patient condition declining or worsening    Shift Goals  Clinical Goals: pain control, nutrition, mobility, safety, discharge planning,  Patient Goals: pain control, comfort, rest  Family Goals: not present at this time    Progress made toward(s) clinical / shift goals:  yes  Patient is not progressing towards the following goals: n/a

## 2022-06-22 NOTE — DISCHARGE PLANNING
Patient is established with Tamara DYER with [facility]. W has sent a staff message to Alondra Peterson requesting that she reach out to the patient after discharge to schedule appropriate hospital follow up care.   Per hospital care management, patient is currently anticipated to discharge to a SNF. Pt resides at an assisted living which she will return back to.     Plan: [facility] will no longer follow at this time as the patient will be discharged with post acute services.

## 2022-06-22 NOTE — THERAPY
Occupational Therapy Contact Note    Attempted OT treatment, pt with low h&h, pending possible transfusion. Symptomatic yesterday during eval. Will attempt as appropriate.    Ava Phillips, OTR/L

## 2022-06-22 NOTE — PROGRESS NOTES
[facility] Medicine Daily Progress Note    Date of Service  6/22/2022    Chief Complaint  Amirah Crane is a 86 y.o. female admitted 6/20/2022 with   Chief Complaint   Patient presents with   • GLF     Pt BIBA as TBI after losing balance while walking with her walker and fell and hit her posterior head. -LOC, -thinners, -c-spine tenderness. Pt reporting R sided hip pain.          Hospital Course  This 86-year-old female with a past medical history significant for GERD presented to the ER on 6/20/2022 after having mechanical ground-level fall.    She is walking with the walker and fell and hit Right side along with head.  X-ray pelvis showing comminuted and foreshortened right femoral intratrochanteric fracture. CT head showed Comminuted, superiorly displaced intertrochanteric right proximal femoral fracture extending to the femoral neck.    Orthopedic surgery consulted and  she underwent Surgical treatment of right intertrochanteric femur fracture with intramedullary device.  Per Orthopedic,  The patient should bear weight as tolerated on their operative extremity. PT and OT has evaluated and recs post-acute. SNF referral has been placed    Interval events:  -- No acute  events overnight, heart rate 61-85, blood pressure 102/45, saturating 96% on 0.5 L of oxygen.  Patient stated that her pain is well controlled.  Patient underwent surgery yesterday, PT/OT has evaluated the patient, recommended postacute, SNF referral has been placed.  --Her hemoglobin is noted to be 11.4, monitor, if less than 7, transfuse    Of care has been discussed the patient, nursing staff, case management.    6/22:  -- No acute Events overnight, vital signs stable, heart rate 66-79, blood pressure 110 x 56, saturating 95% on 1 L of oxygen  -- Patient is noted to have acute blood loss anemia, will provide 1 unit of PRBC transfusion.  Iron study consistent with iron deficiency, will provide IV iron.  Posttransfusion will obtain hemoglobin  hematocrit, if less than 7, will transfuse another unit of blood.  -PT and OT  Evaluated the patient recommended postacute, snf referral has been in place  I have discussed this patient's plan of care and discharge plan at IDT rounds today with Case Management, Nursing, Nursing leadership, and other members of the IDT team.    Consultants/Specialty  orthopedics    Code Status  DNAR, I OK    Disposition  Patient is not medically cleared for discharge.   Anticipate discharge to to home with close outpatient follow-up.  I have placed the appropriate orders for post-discharge needs.    Review of Systems  Review of Systems   Constitutional: Negative for fever and malaise/fatigue.   HENT: Negative for congestion and sore throat.    Eyes: Negative for blurred vision and pain.   Respiratory: Negative for cough, hemoptysis, sputum production and shortness of breath.    Cardiovascular: Negative for chest pain and claudication.   Gastrointestinal: Negative for diarrhea, nausea and vomiting.   Genitourinary: Negative for dysuria.   Musculoskeletal: Positive for joint pain and myalgias.   Neurological: Negative for headaches.   Psychiatric/Behavioral: Negative for suicidal ideas. The patient is nervous/anxious.    All other systems reviewed and are negative.       Physical Exam  Temp:  [35.8 °C (96.5 °F)-36.9 °C (98.4 °F)] 36.9 °C (98.4 °F)  Pulse:  [66-79] 66  Resp:  [17-18] 17  BP: ()/(44-65) 110/56  SpO2:  [94 %-100 %] 95 %    Physical Exam  Vitals and nursing note reviewed.   Constitutional:       Appearance: Normal appearance.   HENT:      Head: Normocephalic and atraumatic.   Eyes:      Extraocular Movements: Extraocular movements intact.      Pupils: Pupils are equal, round, and reactive to light.   Cardiovascular:      Rate and Rhythm: Tachycardia present.      Heart sounds: Normal heart sounds.   Pulmonary:      Breath sounds: Rales present.   Abdominal:      General: There is no distension.      Palpations:  Abdomen is soft.      Tenderness: There is no abdominal tenderness.   Musculoskeletal:      Cervical back: Neck supple.      Comments: decreased ROM 2/2 pain   Skin:     General: Skin is warm.   Neurological:      Mental Status: She is alert and oriented to person, place, and time.      Cranial Nerves: No cranial nerve deficit.   Psychiatric:         Mood and Affect: Mood normal.         Fluids    Intake/Output Summary (Last 24 hours) at 6/22/2022 1516  Last data filed at 6/22/2022 1200  Gross per 24 hour   Intake 240 ml   Output 600 ml   Net -360 ml       Laboratory  Recent Labs     06/20/22  1231 06/22/22  0115 06/22/22  0231   WBC 9.0 8.7 9.8   RBC 4.12* 2.31* 2.42*   HEMOGLOBIN 11.4* 6.4* 6.8*   HEMATOCRIT 35.2* 19.9* 21.1*   MCV 85.4 86.1 87.2   MCH 27.7 27.7 28.1   MCHC 32.4* 32.2* 32.2*   RDW 57.4* 58.0* 59.3*   PLATELETCT 283 171 181   MPV 10.3 10.4 10.4     Recent Labs     06/20/22  1231 06/22/22  0115   SODIUM 135 135   POTASSIUM 4.3 4.6   CHLORIDE 102 105   CO2 22 23   GLUCOSE 103* 120*   BUN 26* 36*   CREATININE 0.75 1.20   CALCIUM 8.9 8.2*     Recent Labs     06/20/22  1231   INR 1.02               Imaging  DX-PORTABLE FLUOROSCOPY < 1 HOUR   Final Result      Portable fluoroscopy utilized for 48 seconds.         INTERPRETING LOCATION: [address] [place] [address], [id number]      DX-HIP-UNILATERAL-W/O PELVIS-2/3 VIEWS RIGHT   Final Result         1.  Intraoperative changes of right femoral intramedullary nailing with dynamic hip screw in progress      CT-HIP W/O PLUS RECONS RIGHT   Final Result      1.  Comminuted, superiorly displaced intertrochanteric right proximal femoral fracture extending to the femoral neck.   2.  Postsurgical changes of prior left proximal femoral ORIF with nonunited fracture of the subtrochanteric proximal left femur.   3.  Remote deformity of the left pubic body/inferior pubic ramus. Acute on chronic fracture cannot be entirely excluded.   4.  No other evidence of acute pelvic fracture.       DX-FEMUR-2+ RIGHT   Final Result      1.  Comminuted and foreshortened right femoral intertrochanteric fracture.   2.  Remote left pubic body deformity.      DX-CHEST-PORTABLE (1 VIEW)   Final Result      1.  Markedly rotated exam. No definite acute cardiac or pulmonary abnormalities are identified.      DX-PELVIS-1 OR 2 VIEWS   Final Result      1.  Comminuted and foreshortened right femoral intertrochanteric fracture.   2.  Cortical disruption of the iliopubic line on the right concerning for nondisplaced fracture.   3.  Prior ORIF left femur with nonunited fracture of the proximal left femur.   4.  Remote left pubic body deformity.      CT-HEAD W/O   Final Result      1. Cerebral atrophy.   2. White matter lucencies most consistent with small vessel ischemic change versus demyelination or gliosis.   3. Otherwise, Head CT without contrast with no acute findings. No evidence of acute cerebral infarction, hemorrhage or mass lesion.   4. Soft tissue injury of the posterior right scalp.   5. Likely inspissated secretions in the right sphenoid sinus.      CT-CSPINE WITHOUT PLUS RECONS   Final Result      Degenerative changes of the cervical spine without acute fracture or malalignment.           Assessment/Plan  * Closed intertrochanteric fracture of right hip, initial encounter (HCC)- (present on admission)  Assessment & Plan  Admit patient to orthopedic floor   --Orthopedic surgery consulted, patient underwent Surgical treatment of right intertrochanteric femur fracture with intramedullary device  -- Obtain PT and oT  dvt ppx   Follow ortho recs    Anemia  Assessment & Plan  Acute blood loss anemia   Will transfuse 1 unit PRBC along with IV iron.  We will repeat CBC  After transfusion     Advanced care planning/counseling discussion  Assessment & Plan  Goals of care discussed with patient, daughter (Reena ) who is power of  at bedside.  They state that patient does not want any chest compressions if she  has cardiac arrest, however is okay with intubation.    Palpitation- (present on admission)  Assessment & Plan  Continue home dose metoprolol   -- bp well controlled       VTE prophylaxis:scd    I have performed a physical exam and reviewed and updated ROS and Plan today (6/22/2022). In review of yesterday's note (6/21/2022), there are no changes except as documented above.

## 2022-06-23 VITALS
TEMPERATURE: 98.6 F | WEIGHT: 110.45 LBS | RESPIRATION RATE: 16 BRPM | BODY MASS INDEX: 19.57 KG/M2 | HEART RATE: 68 BPM | SYSTOLIC BLOOD PRESSURE: 117 MMHG | DIASTOLIC BLOOD PRESSURE: 46 MMHG | OXYGEN SATURATION: 96 % | HEIGHT: 63 IN

## 2022-06-23 LAB
ALBUMIN SERPL BCP-MCNC: 2.7 G/DL (ref 3.2–4.9)
ANION GAP SERPL CALC-SCNC: 10 MMOL/L (ref 7–16)
BUN SERPL-MCNC: 29 MG/DL (ref 8–22)
CALCIUM SERPL-MCNC: 8.3 MG/DL (ref 8.5–10.5)
CHLORIDE SERPL-SCNC: 103 MMOL/L (ref 96–112)
CO2 SERPL-SCNC: 23 MMOL/L (ref 20–33)
CREAT SERPL-MCNC: 0.66 MG/DL (ref 0.5–1.4)
ERYTHROCYTE [DISTWIDTH] IN BLOOD BY AUTOMATED COUNT: 53.1 FL (ref 35.9–50)
GFR SERPLBLD CREATININE-BSD FMLA CKD-EPI: 85 ML/MIN/1.73 M 2
GLUCOSE SERPL-MCNC: 125 MG/DL (ref 65–99)
HCT VFR BLD AUTO: 25.3 % (ref 37–47)
HGB BLD-MCNC: 8.4 G/DL (ref 12–16)
MCH RBC QN AUTO: 28.7 PG (ref 27–33)
MCHC RBC AUTO-ENTMCNC: 33.2 G/DL (ref 33.6–35)
MCV RBC AUTO: 86.3 FL (ref 81.4–97.8)
PHOSPHATE SERPL-MCNC: 2.2 MG/DL (ref 2.5–4.5)
PLATELET # BLD AUTO: 173 K/UL (ref 164–446)
PMV BLD AUTO: 10 FL (ref 9–12.9)
POTASSIUM SERPL-SCNC: 4.5 MMOL/L (ref 3.6–5.5)
RBC # BLD AUTO: 2.93 M/UL (ref 4.2–5.4)
SODIUM SERPL-SCNC: 136 MMOL/L (ref 135–145)
WBC # BLD AUTO: 8.4 K/UL (ref 4.8–10.8)

## 2022-06-23 PROCEDURE — 700102 HCHG RX REV CODE 250 W/ 637 OVERRIDE(OP): Performed by: STUDENT IN AN ORGANIZED HEALTH CARE EDUCATION/TRAINING PROGRAM

## 2022-06-23 PROCEDURE — A9270 NON-COVERED ITEM OR SERVICE: HCPCS | Performed by: ORTHOPAEDIC SURGERY

## 2022-06-23 PROCEDURE — 97530 THERAPEUTIC ACTIVITIES: CPT | Mod: CQ

## 2022-06-23 PROCEDURE — 80069 RENAL FUNCTION PANEL: CPT

## 2022-06-23 PROCEDURE — 36415 COLL VENOUS BLD VENIPUNCTURE: CPT

## 2022-06-23 PROCEDURE — 700102 HCHG RX REV CODE 250 W/ 637 OVERRIDE(OP): Performed by: ORTHOPAEDIC SURGERY

## 2022-06-23 PROCEDURE — 85027 COMPLETE CBC AUTOMATED: CPT

## 2022-06-23 PROCEDURE — 99239 HOSP IP/OBS DSCHRG MGMT >30: CPT | Performed by: HOSPITALIST

## 2022-06-23 PROCEDURE — 97535 SELF CARE MNGMENT TRAINING: CPT

## 2022-06-23 PROCEDURE — A9270 NON-COVERED ITEM OR SERVICE: HCPCS | Performed by: STUDENT IN AN ORGANIZED HEALTH CARE EDUCATION/TRAINING PROGRAM

## 2022-06-23 RX ORDER — OXYCODONE HYDROCHLORIDE 5 MG/1
5 TABLET ORAL EVERY 8 HOURS PRN
Qty: 8 TABLET | Refills: 0 | Status: SHIPPED | OUTPATIENT
Start: 2022-06-23 | End: 2022-06-26

## 2022-06-23 RX ORDER — ENOXAPARIN SODIUM 100 MG/ML
40 INJECTION SUBCUTANEOUS
COMMUNITY
Start: 2022-06-23 | End: 2022-09-06

## 2022-06-23 RX ADMIN — BISACODYL 10 MG: 10 SUPPOSITORY RECTAL at 11:47

## 2022-06-23 RX ADMIN — ACETAMINOPHEN 650 MG: 325 TABLET ORAL at 09:30

## 2022-06-23 RX ADMIN — METOPROLOL SUCCINATE 37.5 MG: 25 TABLET, EXTENDED RELEASE ORAL at 05:04

## 2022-06-23 RX ADMIN — MAGNESIUM HYDROXIDE 30 ML: 400 SUSPENSION ORAL at 11:26

## 2022-06-23 RX ADMIN — OXYCODONE 5 MG: 5 TABLET ORAL at 05:10

## 2022-06-23 RX ADMIN — FLUOXETINE 40 MG: 20 CAPSULE ORAL at 05:08

## 2022-06-23 RX ADMIN — POLYETHYLENE GLYCOL 3350 1 PACKET: 17 POWDER, FOR SOLUTION ORAL at 05:10

## 2022-06-23 RX ADMIN — ACETAMINOPHEN 650 MG: 325 TABLET ORAL at 05:09

## 2022-06-23 RX ADMIN — OXYCODONE 5 MG: 5 TABLET ORAL at 09:30

## 2022-06-23 RX ADMIN — DOCUSATE SODIUM 100 MG: 100 CAPSULE, LIQUID FILLED ORAL at 05:09

## 2022-06-23 ASSESSMENT — COGNITIVE AND FUNCTIONAL STATUS - GENERAL
MOBILITY SCORE: 6
STANDING UP FROM CHAIR USING ARMS: TOTAL
DRESSING REGULAR UPPER BODY CLOTHING: A LOT
PERSONAL GROOMING: A LOT
SUGGESTED CMS G CODE MODIFIER DAILY ACTIVITY: CL
DAILY ACTIVITIY SCORE: 13
EATING MEALS: A LITTLE
MOVING TO AND FROM BED TO CHAIR: UNABLE
SUGGESTED CMS G CODE MODIFIER MOBILITY: CN
CLIMB 3 TO 5 STEPS WITH RAILING: TOTAL
TOILETING: A LOT
WALKING IN HOSPITAL ROOM: TOTAL
DRESSING REGULAR LOWER BODY CLOTHING: A LOT
HELP NEEDED FOR BATHING: A LOT
MOVING FROM LYING ON BACK TO SITTING ON SIDE OF FLAT BED: UNABLE
TURNING FROM BACK TO SIDE WHILE IN FLAT BAD: UNABLE

## 2022-06-23 ASSESSMENT — ENCOUNTER SYMPTOMS
DOUBLE VISION: 0
COUGH: 0
HEARTBURN: 0
CLAUDICATION: 0
HEADACHES: 0
SHORTNESS OF BREATH: 0
SORE THROAT: 0
BLURRED VISION: 0
NERVOUS/ANXIOUS: 1
FEVER: 0
MYALGIAS: 1
DIARRHEA: 0

## 2022-06-23 ASSESSMENT — PAIN DESCRIPTION - PAIN TYPE: TYPE: SURGICAL PAIN

## 2022-06-23 ASSESSMENT — GAIT ASSESSMENTS: GAIT LEVEL OF ASSIST: UNABLE TO PARTICIPATE

## 2022-06-23 NOTE — THERAPY
"Occupational Therapy  Daily Treatment     Patient Name: Amirah Crane  Age:  86 y.o., Sex:  female  Medical Record #: [id number]  Today's Date: 6/23/2022     Precautions  Precautions: Fall Risk, Weight Bearing As Tolerated Right Lower Extremity    Assessment    Pt now s/p blood transfusion w/ improved H&H. She participated in seated ADLs and multiple STS in prep for ADL txfs. Difficulty weight shifting, fatigues quickly. Remains limited by weakness, fatigue, impaired balance, pain.    Plan    Continue current treatment plan.    DC Equipment Recommendations: Unable to determine at this time  Discharge Recommendations: Recommend post-acute placement for additional occupational therapy services prior to discharge home    Subjective    \"Is the doctor in yet? I need to talk to him about discharging.\"     Objective       06/23/22 0825   Cognition    Cognition / Consciousness X   Level of Consciousness Alert   Comments pleasant and cooperative, more alert today. difficult to understand her at times   Balance   Sitting Balance (Static) Fair   Sitting Balance (Dynamic) Fair -   Standing Balance (Static) Trace +   Standing Balance (Dynamic) Trace   Weight Shift Sitting Poor   Weight Shift Standing Poor   Skilled Intervention Verbal Cuing;Tactile Cuing;Sequencing   Comments w/ FWW and then w/ HHA x2   Bed Mobility    Supine to Sit Moderate Assist   Sit to Supine Maximal Assist   Scooting Maximal Assist   Skilled Intervention Verbal Cuing;Tactile Cuing;Sequencing   Activities of Daily Living   Grooming Seated;Moderate Assist  (hair care)   Lower Body Dressing Maximal Assist   Toileting Maximal Assist   Skilled Intervention Verbal Cuing;Tactile Cuing;Sequencing   How much help from another person does the patient currently need...   Putting on and taking off regular lower body clothing? 2   Bathing (including washing, rinsing, and drying)? 2   Toileting, which includes using a toilet, bedpan, or urinal? 2   Putting on and " taking off regular upper body clothing? 2   Taking care of personal grooming such as brushing teeth? 2   Eating meals? 3   6 Clicks Daily Activity Score 13   Functional Mobility   Sit to Stand Maximal Assist   Bed, Chair, Wheelchair Transfer Unable to Participate   Mobility EOB> multiple STS>BTB   Skilled Intervention Verbal Cuing;Tactile Cuing;Sequencing   Comments difficulty weight shifting and coming fully upright   Patient / Family Goals   Patient / Family Goal #1 to go home   Goal #1 Outcome Goal not met   Short Term Goals   Short Term Goal # 1 pt will demo stand pivot txf to [facility] with Wanda   Goal Outcome # 1 Goal not met   Short Term Goal # 2 pt will demo toileting with supv   Goal Outcome # 2 Goal not met   Short Term Goal # 3 pt will groom seated at the sink w/ supv   Goal Outcome # 3 Goal not met

## 2022-06-23 NOTE — PROGRESS NOTES
Hospital Medicine Daily Progress Note    Date of Service  6/23/2022    Chief Complaint  Amirah Crane is a 86 y.o. female admitted 6/20/2022 with   Chief Complaint   Patient presents with   • GLF     Pt BIBA as TBI after losing balance while walking with her walker and fell and hit her posterior head. -LOC, -thinners, -c-spine tenderness. Pt reporting R sided hip pain.          Hospital Course  This 86-year-old female with a past medical history significant for GERD presented to the ER on 6/20/2022 after having mechanical ground-level fall.      She is walking with the walker and fell and hit Right side along with head.  X-ray pelvis showing comminuted and foreshortened right femoral intratrochanteric fracture. CT head showed Comminuted, superiorly displaced intertrochanteric right proximal femoral fracture extending to the femoral neck.    Orthopedic surgery consulted and  she underwent Surgical treatment of right intertrochanteric femur fracture with intramedullary device.  Per Orthopedic, she should bear weight as tolerated on their operative extremity. PT and OT has evaluated and recs post-acute. SNF referral has been placed    Her hospital course complicated with acute blood loss anemia, status post PRBC transfusion on 6/22, repeat hemoglobin at 8.4.  Iron study was obtained, she is noted to have iron deficiency anemia.  Will provide IV iron.    Of note she was noted to have mild SALIMA on 6/22, improved, likely secondary to prerenal etiology.    At this time patient is medically stable to discharge risk nursing facility.  For all chronic medical condition, she will resume her home medication  I have discussed this patient's plan of care and discharge plan at IDT rounds today with Case Management, Nursing, Nursing leadership, and other members of the IDT team.    Interval events :  6/23:  -No acute events  overnight, medicine has been stable, heart rate 68-84, blood pressure 117/50, saturating 96% on room  air  --Phos low at 2.2 , will replete as noted  -- Hemoglobin 8.4 will monitor, will transfuse if less than 7  --She us  noted to have iron deficiency anemia, will provide IV iron.  PT/OT has evaluate the patient, recommended postacute, SNF referral has been placed.  At this time patient is medically stable to be discharged to skilled nursing facility    Consultants/Specialty  orthopedics    Code Status  DNAR, I OK    Disposition  Patient is not medically cleared for discharge.   Anticipate discharge to to home with close outpatient follow-up.  I have placed the appropriate orders for post-discharge needs.    Review of Systems  Review of Systems   Constitutional: Negative for fever and malaise/fatigue.   HENT: Negative for congestion and sore throat.    Eyes: Negative for blurred vision and double vision.   Respiratory: Negative for cough and shortness of breath.    Cardiovascular: Negative for chest pain and claudication.   Gastrointestinal: Negative for diarrhea and heartburn.   Genitourinary: Negative for dysuria.   Musculoskeletal: Positive for joint pain and myalgias.   Neurological: Negative for headaches.   Psychiatric/Behavioral: The patient is nervous/anxious.    All other systems reviewed and are negative.       Physical Exam  Temp:  [36.2 °C (97.1 °F)-37 °C (98.6 °F)] 37 °C (98.6 °F)  Pulse:  [65-84] 68  Resp:  [16-17] 16  BP: (110-157)/(46-74) 117/46  SpO2:  [95 %-100 %] 96 %    Physical Exam  Vitals and nursing note reviewed.   Constitutional:       Appearance: Normal appearance.   HENT:      Head: Normocephalic and atraumatic.   Eyes:      Extraocular Movements: Extraocular movements intact.   Cardiovascular:      Rate and Rhythm: Tachycardia present.      Heart sounds: Normal heart sounds.   Pulmonary:      Breath sounds: Rales present.   Abdominal:      General: There is no distension.      Palpations: Abdomen is soft.      Tenderness: There is no abdominal tenderness.   Musculoskeletal:       Cervical back: Neck supple.      Comments: decreased ROM 2/2 pain   Skin:     General: Skin is warm.   Neurological:      Mental Status: She is alert and oriented to person, place, and time.      Cranial Nerves: No cranial nerve deficit.   Psychiatric:         Mood and Affect: Mood normal.         Fluids    Intake/Output Summary (Last 24 hours) at 6/23/2022 1025  Last data filed at 6/22/2022 1758  Gross per 24 hour   Intake 289.58 ml   Output 600 ml   Net -310.42 ml       Laboratory  Recent Labs     06/22/22  0115 06/22/22  0231 06/23/22  0611   WBC 8.7 9.8 8.4   RBC 2.31* 2.42* 2.93*   HEMOGLOBIN 6.4* 6.8* 8.4*   HEMATOCRIT 19.9* 21.1* 25.3*   MCV 86.1 87.2 86.3   MCH 27.7 28.1 28.7   MCHC 32.2* 32.2* 33.2*   RDW 58.0* 59.3* 53.1*   PLATELETCT 171 181 173   MPV 10.4 10.4 10.0     Recent Labs     06/20/22  1231 06/22/22  0115 06/23/22  0611   SODIUM 135 135 136   POTASSIUM 4.3 4.6 4.5   CHLORIDE 102 105 103   CO2 22 23 23   GLUCOSE 103* 120* 125*   BUN 26* 36* 29*   CREATININE 0.75 1.20 0.66   CALCIUM 8.9 8.2* 8.3*     Recent Labs     06/20/22  1231   INR 1.02               Imaging  DX-PORTABLE FLUOROSCOPY < 1 HOUR   Final Result      Portable fluoroscopy utilized for 48 seconds.         INTERPRETING LOCATION: [address] [place] [address], [id number]      DX-HIP-UNILATERAL-W/O PELVIS-2/3 VIEWS RIGHT   Final Result         1.  Intraoperative changes of right femoral intramedullary nailing with dynamic hip screw in progress      CT-HIP W/O PLUS RECONS RIGHT   Final Result      1.  Comminuted, superiorly displaced intertrochanteric right proximal femoral fracture extending to the femoral neck.   2.  Postsurgical changes of prior left proximal femoral ORIF with nonunited fracture of the subtrochanteric proximal left femur.   3.  Remote deformity of the left pubic body/inferior pubic ramus. Acute on chronic fracture cannot be entirely excluded.   4.  No other evidence of acute pelvic fracture.      DX-FEMUR-2+ RIGHT   Final Result       1.  Comminuted and foreshortened right femoral intertrochanteric fracture.   2.  Remote left pubic body deformity.      DX-CHEST-PORTABLE (1 VIEW)   Final Result      1.  Markedly rotated exam. No definite acute cardiac or pulmonary abnormalities are identified.      DX-PELVIS-1 OR 2 VIEWS   Final Result      1.  Comminuted and foreshortened right femoral intertrochanteric fracture.   2.  Cortical disruption of the iliopubic line on the right concerning for nondisplaced fracture.   3.  Prior ORIF left femur with nonunited fracture of the proximal left femur.   4.  Remote left pubic body deformity.      CT-HEAD W/O   Final Result      1. Cerebral atrophy.   2. White matter lucencies most consistent with small vessel ischemic change versus demyelination or gliosis.   3. Otherwise, Head CT without contrast with no acute findings. No evidence of acute cerebral infarction, hemorrhage or mass lesion.   4. Soft tissue injury of the posterior right scalp.   5. Likely inspissated secretions in the right sphenoid sinus.      CT-CSPINE WITHOUT PLUS RECONS   Final Result      Degenerative changes of the cervical spine without acute fracture or malalignment.           Assessment/Plan  * Closed intertrochanteric fracture of right hip, initial encounter (HCC)- (present on admission)  Assessment & Plan  Admit patient to orthopedic floor   --Orthopedic surgery consulted, patient underwent Surgical treatment of right intertrochanteric femur fracture with intramedullary device  -- PT and OT  recs  Post-acute    --medically cleared to be discharged poast-acute      Anemia  Assessment & Plan  Acute blood loss anemia    -- s/p PRBC transfusion   -- Hemoglobin at 8.4      Advanced care planning/counseling discussion  Assessment & Plan  Goals of care discussed with patient, daughter (Reena ) who is power of  at bedside.  They state that patient does not want any chest compressions if she has cardiac arrest, however is okay with  intubation.    Palpitation- (present on admission)  Assessment & Plan  Continue home dose metoprolol   -- bp well controlled       VTE prophylaxis: SCDs/TEDs and enoxaparin ppx     I have performed a physical exam and reviewed and updated ROS and Plan today (6/23/2022). In review of yesterday's note (6/22/2022), there are no changes except as documented above.

## 2022-06-23 NOTE — DISCHARGE PLANNING
Case Management Discharge Planning    Admission Date: 6/20/2022  GMLOS: 3.3  ALOS: 3    6-Clicks ADL Score: 13  6-Clicks Mobility Score: 6  PT and/or OT Eval ordered: Yes  Post-acute Referrals Ordered: Yes  Post-acute Choice Obtained: Yes  Has referral(s) been sent to post-acute provider:  Yes      Anticipated Discharge Dispo: Discharge Disposition: D/T to SNF with Medicare cert in anticipation of skilled care (03)    DME Needed: No    Action(s) Taken: Met with patient and her son Raudel at bedside, patient stable for discharge to snf today, snf choices sent with [facility] as first [facility]. CM will update patient and her son accordingly.       Update 11.30am: [facility] have [facility] patient for today with transport  at 1pm, Serene aware that patient has not had BM however BM meds were given.     Escalations Completed: None    Medically Clear: Yes    Next Steps: Await snf acceptance.     Barriers to Discharge: None

## 2022-06-23 NOTE — CARE PLAN
The patient is Watcher - Medium risk of patient condition declining or worsening    Shift Goals  Clinical Goals: Pain Control, Mobility, Safety  Patient Goals: Pain Control  Family Goals: DC planning    Progress made toward(s) clinical / shift goals:    Problem: Pain - Standard  Goal: Alleviation of pain or a reduction in pain to the patient’s comfort goal  Outcome: Progressing  Note: Pt medicated with PRN oxycodone per MAR.      Problem: Fall Risk  Goal: Patient will remain free from falls  Outcome: Progressing  Note: Fall risk assessed using Yanez-Shaheen Scale. Pt is HIGH fall risk. Fall precautions in place including bed alarm. Pt educated to call for assistance.         Patient is not progressing towards the following goals: NA

## 2022-06-23 NOTE — DISCHARGE SUMMARY
Discharge Summary    CHIEF COMPLAINT ON ADMISSION  Chief Complaint   Patient presents with   • GLF     Pt BIBA as TBI after losing balance while walking with her walker and fell and hit her posterior head. -LOC, -thinners, -c-spine tenderness. Pt reporting R sided hip pain.        Reason for Admission  EMS     Admission Date  6/20/2022    CODE STATUS  DNAR, I OK    HPI & HOSPITAL COURSE  This 86-year-old female with a past medical history significant for GERD presented to the ER on 6/20/2022 after having mechanical ground-level fall.       She is walking with the walker and fell and hit Right side along with head.  X-ray pelvis showing comminuted and foreshortened right femoral intratrochanteric fracture. CT head showed Comminuted, superiorly displaced intertrochanteric right proximal femoral fracture extending to the femoral neck.     Orthopedic surgery consulted and  she underwent Surgical treatment of right intertrochanteric femur fracture with intramedullary device.  Per Orthopedic, she should bear weight as tolerated on their operative extremity. PT and OT has evaluated and recs post-acute. SNF referral has been placed     Her hospital course complicated with acute blood loss anemia, status post PRBC transfusion on 6/22, repeat hemoglobin at 8.4.  Iron study was obtained, she is noted to have iron deficiency anemia.  Will provide IV iron.     Of note she was noted to have mild SALIMA on 6/22, improved, likely secondary to prerenal etiology.     At this time patient is medically stable to discharge risk nursing facility.  For all chronic medical condition, she will resume her home medication  I have discussed this patient's plan of care and discharge plan at IDT rounds today with Case Management, Nursing, Nursing leadership, and other members of the IDT team.    Therefore, she is discharged in fair and stable condition to skilled nursing facility.    The patient met 2-midnight criteria for an inpatient stay at the  time of discharge.    Discharge Date  6/23/2022    FOLLOW UP ITEMS POST DISCHARGE  Tamara Culver P.A.-C.  Please follow up with Orthopedic surgery as an op    DISCHARGE DIAGNOSES  Principal Problem:    Closed intertrochanteric fracture of right hip, initial encounter ([facility]) POA: Yes  Active Problems:    Palpitation POA: Yes    Advanced care planning/counseling discussion POA: Unknown    Anemia POA: Unknown  Resolved Problems:    * No resolved hospital problems. *      FOLLOW UP  No future appointments.  No follow-up provider specified.    MEDICATIONS ON DISCHARGE     Medication List      START taking these medications      Instructions   enoxaparin 40 MG/0.4ML Sosy inj  Commonly known as: Lovenox   Inject 40 mg under the skin.  Dose: 40 mg     oxyCODONE immediate-release 5 MG Tabs  Commonly known as: ROXICODONE   Take 1 Tablet by mouth every 8 hours as needed for Severe Pain for up to 3 days.  Dose: 5 mg        CHANGE how you take these medications      Instructions   hydrOXYzine HCl 25 MG Tabs  What changed: how much to take  Commonly known as: ATARAX   Take 1 Tablet by mouth 1 time a day as needed for Anxiety.  Dose: 25 mg        CONTINUE taking these medications      Instructions   acetaminophen 650 MG CR tablet  Commonly known as: TYLENOL   Take 650 mg by mouth every 6 hours as needed for Mild Pain.  Dose: 650 mg     calcium carbonate 500 MG Chew  Commonly known as: Tums   Chew 500 mg 3 times a day as needed. Indications: Acid Indigestion, Heartburn  Dose: 500 mg     diclofenac sodium 1 % Gel  Commonly known as: Voltaren   Apply 2 g topically every 8 hours as needed (pain). Apply to hip or shoulder for joint pain.  Dose: 2 g     Flonase Sensimist 27.5 MCG/SPRAY nasal spray  Generic drug: fluticasone   Doctor's comments: Profile only, family provided medication.  Administer 1 Spray into affected nostril(S) every day. 1 spray both nostrils.  Dose: 1 Spray     fluoxetine 40 MG capsule  Commonly known as: PROZAC    Take 1 Capsule by mouth every day.  Dose: 40 mg     magnesium hydroxide 400 MG/5ML Susp  Commonly known as: MILK OF MAGNESIA   Take 30 mL by mouth 1 time a day as needed (constipation).  Dose: 30 mL     Melatonin 10 MG Tabs   Take  by mouth.     metoprolol SR 25 MG Tb24  Commonly known as: TOPROL XL   Take 37.5 mg by mouth at bedtime.  Dose: 37.5 mg     nortriptyline 10 MG Caps  Commonly known as: PAMELOR   TAKE 1 CAPSULES BY MOUTH EVERY NIGHT AT BEDTIME AS NEEDED FOR LEG CRAMPS.     ondansetron 4 MG Tbdp  Commonly known as: ZOFRAN ODT   Take 4 mg by mouth every 8 hours as needed for Nausea.  Dose: 4 mg     oxybutynin 5 MG Tabs  Commonly known as: DITROPAN   Take 5 mg by mouth every day.  Dose: 5 mg     pantoprazole 40 MG Tbec  Commonly known as: PROTONIX   Take 40 mg by mouth 2 times a day.  Dose: 40 mg     polyethylene glycol 3350 17 GM/SCOOP Powd  Commonly known as: MIRALAX   Take 17 g by mouth every day. Hold for loose stools  Dose: 17 g     traMADol 50 MG Tabs  Commonly known as: ULTRAM   Take 50 mg by mouth every 8 hours as needed for Moderate Pain.  Dose: 50 mg        STOP taking these medications    celecoxib 200 MG Caps  Commonly known as: CELEBREX     magnesium oxide 400 MG Tabs tablet  Commonly known as: MAG-OX     mirtazapine 7.5 MG tablet  Commonly known as: Remeron            Allergies  Allergies   Allergen Reactions   • Pcn [Penicillins] Rash     Full body     Many years ago per family   • Pseudoephedrine      Other reaction(s): LEGS JERK       DIET  Orders Placed This Encounter   Procedures   • Diet Order Diet: Regular     Standing Status:   Standing     Number of Occurrences:   1     Order Specific Question:   Diet:     Answer:   Regular [1]       ACTIVITY  As tolerated.  Weight bearing as tolerated    CONSULTATIONS  orthjo    PROCEDURES  Surgical treatment of right intertrochanteric femur fracture with intramedullary device       LABORATORY  Lab Results   Component Value Date    SODIUM 136  06/23/2022    POTASSIUM 4.5 06/23/2022    CHLORIDE 103 06/23/2022    CO2 23 06/23/2022    GLUCOSE 125 (H) 06/23/2022    BUN 29 (H) 06/23/2022    CREATININE 0.66 06/23/2022        Lab Results   Component Value Date    WBC 8.4 06/23/2022    HEMOGLOBIN 8.4 (L) 06/23/2022    HEMATOCRIT 25.3 (L) 06/23/2022    PLATELETCT 173 06/23/2022        Total time of the discharge process exceeds 35 minutes.

## 2022-06-23 NOTE — DISCHARGE PLANNING
[facility] Transitional Care Coordination    Referral from:  Dr. Arndt  Insurance Provider on Facesheet: Medicare/[employer]  Potential Rehab Diagnosis:  Unilateral hip fracture    Chart review indicates patient on going medical management and therapy needs to possibly meet inpatient rehab facility criteria with the goal of returning to community.    D/C support: Adult children     Physiatry to consult.  POD 3 surgical treatment R hip fracture.      Last Covid test:       Thank you for the referral.

## 2022-06-23 NOTE — CONSULTS
Physical Medicine and Rehabilitation Consultation          Date of initial consultation: 6/23/2022  Consulting provider: Alberto Arndt MD  Reason for consultation: assess for acute inpatient rehab appropriateness  LOS: 3 Day(s)    Chief complaint: Ground-level fall    HPI: The patient is a 86 y.o. female with a past medical history of GERD;  who presented on 6/20/2022 12:08 PM with injury sustained in a witnessed mechanical ground-level fall.  Patient's walker got away from her causing her to fall.  Patient had a positive head strike and acute onset right hip pain.  Work-up in the ED found right intertrochanteric femur fracture.  Patient was taken to the OR by Dr. Alberto Arndt MD on 6/20/2022 for intramedullary device repair of right hip fracture.    Hospital course has been complicated by anemia, status post PRBC transfusion on 6/22, also receiving IV iron for iron deficiency anemia.  Patient had a mild SALIMA on 6/22, etiology prerenal.    ROS  Pertinent positives are mentioned in the HPI, all others reviewed and are negative.    Social Hx:  Assisted living residence   Patient has a button she can push to get help    THERAPY:  Restrictions: WBAT  PT: Functional mobility   6/23: Unable to participate in gait, max assist sit to stand    OT: ADLs  6/23: Max assist toileting and lower body dressing    SLP:   None    IMAGING:  CT hip 6/20/2022  1.  Comminuted, superiorly displaced intertrochanteric right proximal femoral fracture extending to the femoral neck.  2.  Postsurgical changes of prior left proximal femoral ORIF with nonunited fracture of the subtrochanteric proximal left femur.  3.  Remote deformity of the left pubic body/inferior pubic ramus. Acute on chronic fracture cannot be entirely excluded.  4.  No other evidence of acute pelvic fracture.    CT head 6/20/2022  1. Cerebral atrophy.  2. White matter lucencies most consistent with small vessel ischemic change versus demyelination or gliosis.  3.  Otherwise, Head CT without contrast with no acute findings. No evidence of acute cerebral infarction, hemorrhage or mass lesion.  4. Soft tissue injury of the posterior right scalp.  5. Likely inspissated secretions in the right sphenoid sinus.    PROCEDURES:  Alberto Arndt MD 6/20/2022  Surgical treatment of right intertrochanteric femur fracture with intramedullary device    PMH:  Past Medical History:   Diagnosis Date   • GERD (gastroesophageal reflux disease)        PSH:  Past Surgical History:   Procedure Laterality Date   • FEMUR NAILING INTRAMEDULLARY Right 6/20/2022    Procedure: SURGICAL TREATMENT OF RIGHT INTERTROCHANTERIC FEMUR FRACTURE WITH INTRAMEDULLARY DEVICE;  Surgeon: Alberto Arndt M.D.;  Location: SURGERY [facility];  Service: Orthopedics       FHX:  Non-pertinent to today's issues    Medications:  Current Facility-Administered Medications   Medication Dose   • magnesium citrate solution 296 mL  296 mL   • ferric gluconate complex (FERRLECIT) 125 mg in  mL IVPB  125 mg    Followed by   • [START ON 6/24/2022] ferric gluconate complex (FERRLECIT) 250 mg in  mL IVPB  250 mg   • [Held by provider] enoxaparin (Lovenox) inj 40 mg  40 mg   • Pharmacy Consult Request ...Pain Management Review 1 Each  1 Each   • ondansetron (ZOFRAN) syringe/vial injection 4 mg  4 mg   • dexamethasone (DECADRON) injection 4 mg  4 mg   • diphenhydrAMINE (BENADRYL) injection 25 mg  25 mg   • haloperidol lactate (HALDOL) injection 1 mg  1 mg   • scopolamine (TRANSDERM-SCOP) patch 1 Patch  1 Patch   • docusate sodium (COLACE) capsule 100 mg  100 mg   • senna-docusate (PERICOLACE or SENOKOT S) 8.6-50 MG per tablet 1 Tablet  1 Tablet   • senna-docusate (PERICOLACE or SENOKOT S) 8.6-50 MG per tablet 1 Tablet  1 Tablet   • polyethylene glycol/lytes (MIRALAX) PACKET 1 Packet  1 Packet   • magnesium hydroxide (MILK OF MAGNESIA) suspension 30 mL  30 mL   • bisacodyl (DULCOLAX) suppository 10 mg  10 mg   • sodium  "phosphate (Fleet) enema 133 mL  1 Each   • acetaminophen (Tylenol) tablet 650 mg  650 mg    Followed by   • [START ON 6/26/2022] acetaminophen (Tylenol) tablet 650 mg  650 mg   • oxyCODONE immediate-release (ROXICODONE) tablet 5 mg  5 mg    Or   • oxyCODONE immediate release (ROXICODONE) tablet 10 mg  10 mg    Or   • HYDROmorphone (Dilaudid) injection 0.5 mg  0.5 mg   • FLUoxetine (PROZAC) capsule 40 mg  40 mg   • metoprolol SR (TOPROL XL) tablet 37.5 mg  37.5 mg   • morphine 4 MG/ML injection 1 mg  1 mg       Allergies:  Allergies   Allergen Reactions   • Pcn [Penicillins] Rash     Full body     Many years ago per family   • Pseudoephedrine      Other reaction(s): LEGS JERK         Physical Exam:  Vitals: /46   Pulse 68   Temp 37 °C (98.6 °F) (Temporal)   Resp 16   Ht 1.6 m (5' 3\")   Wt 50.1 kg (110 lb 7.2 oz)   SpO2 96%      Labs: Reviewed and significant for   Recent Labs     06/22/22  0115 06/22/22  0231 06/23/22  0611   RBC 2.31* 2.42* 2.93*   HEMOGLOBIN 6.4* 6.8* 8.4*   HEMATOCRIT 19.9* 21.1* 25.3*   PLATELETCT 171 181 173   IRON 14*  --   --    TOTIRONBC 185*  --   --      Recent Labs     06/22/22  0115 06/23/22  0611   SODIUM 135 136   POTASSIUM 4.6 4.5   CHLORIDE 105 103   CO2 23 23   GLUCOSE 120* 125*   BUN 36* 29*   CREATININE 1.20 0.66   CALCIUM 8.2* 8.3*     Recent Results (from the past 24 hour(s))   Basic Metabolic Panel    Collection Time: 06/23/22  6:11 AM   Result Value Ref Range    Sodium 136 135 - 145 mmol/L    Potassium 4.5 3.6 - 5.5 mmol/L    Chloride 103 96 - 112 mmol/L    Co2 23 20 - 33 mmol/L    Glucose 125 (H) 65 - 99 mg/dL    Bun 29 (H) 8 - 22 mg/dL    Creatinine 0.66 0.50 - 1.40 mg/dL    Calcium 8.3 (L) 8.5 - 10.5 mg/dL    Anion Gap 10.0 7.0 - 16.0   CBC WITHOUT DIFFERENTIAL    Collection Time: 06/23/22  6:11 AM   Result Value Ref Range    WBC 8.4 4.8 - 10.8 K/uL    RBC 2.93 (L) 4.20 - 5.40 M/uL    Hemoglobin 8.4 (L) 12.0 - 16.0 g/dL    Hematocrit 25.3 (L) 37.0 - 47.0 %    MCV " 86.3 81.4 - 97.8 fL    MCH 28.7 27.0 - 33.0 pg    MCHC 33.2 (L) 33.6 - 35.0 g/dL    RDW 53.1 (H) 35.9 - 50.0 fL    Platelet Count 173 164 - 446 K/uL    MPV 10.0 9.0 - 12.9 fL   Renal Function Panel    Collection Time: 06/23/22  6:11 AM   Result Value Ref Range    Phosphorus 2.2 (L) 2.5 - 4.5 mg/dL    Albumin 2.7 (L) 3.2 - 4.9 g/dL   ESTIMATED GFR    Collection Time: 06/23/22  6:11 AM   Result Value Ref Range    GFR (CKD-EPI) 85 >60 mL/min/1.73 m 2       Assessment:  Patient discharged before I could see her    Thank you for allowing us to participate in the care of this patient.     Connor Jorgensen, DO   Physical Medicine and Rehabilitation     Please note that this dictation was created using voice recognition software. I have made every reasonable attempt to correct obvious errors, but there may be errors of grammar and possibly content that I did not discover before finalizing the note.

## 2022-06-23 NOTE — PROGRESS NOTES
15:30 Blood transfusion orders received. Transfusion started.     18:00 transfusion completed. Patient tolerated with no reactions noted.

## 2022-06-23 NOTE — DOCUMENTATION QUERY
[facility]                                                                       Query Response Note      PATIENT:               NAMITA VALDOVINOS  ACCT #:                  [id number]  MRN:                     [id number]  :                      1935  ADMIT DATE:       2022 12:08 PM  DISCH DATE:        2022 2:30 PM  RESPONDING  PROVIDER #:        [id number]           QUERY TEXT:    Anemia is documented in the Medical Record. Please specify the most likely type of anemia.              The patient's Clinical Indicators include:  NOTED    Anemia  Hgb: 11.4 - 6.4 -6.8 / Hct 35.2 - 21.1- Plts 283 - 181  EBL: 0 ml   BMI 19.57  Intraop IVF/ LR&NS IVF/ 1 Unit PRBC  Femur fracture- s/p IM device   Scalp laceration/ Repair    Advanced age     Thank you,  Sara Sosa RN, CCS  Clinical Documentation Integrity  Connect via Voalte  Options provided:   -- Blood loss anemia, acute   -- Blood loss anemia, chronic   -- Simple - nutritional anemia   -- Dilutional anemia   -- Other, (Pls specify)   -- Unable to determine      Query created by: Sara Sosa on 2022 9:42 AM    RESPONSE TEXT:    Blood loss anemia, acute          Electronically signed by:  MADELYN BARBOZA MD 2022 2:36 PM

## 2022-06-23 NOTE — DISCHARGE INSTRUCTIONS
Discharge Instructions    [facility] with escort. Discharged via wheelchair, hospital escort: Yes.  Special equipment needed: Not Applicable    Be sure to schedule a follow-up appointment with your primary care doctor or any specialists as instructed.   Follow up with Dr. Arndt within two weeks  Take medications as prescribed  For any questions or concerns please contact surgeon or PCP    Discharge Plan:        I understand that a diet low in cholesterol, fat, and sodium is recommended for good health. Unless I have been given specific instructions below for another diet, I accept this instruction as my diet prescription.   Other diet: Regular diet    Special Instructions: Discharge instructions for the Orthopedic Patient    Follow up with Primary Care Physician within 2 weeks of discharge to home, regarding:  Review of medications and diagnostic testing.  Surveillance for medical complications.  Workup and treatment of osteoporosis, if appropriate.     -Is this a Hip/Knee/Shoulder Joint Replacement patient? No    -Is this patient being discharged with medication to prevent blood clots?  Yes, Lovenox   Enoxaparin injection  What is this medicine?  ENOXAPARIN (ee nox a PA rin) is used after knee, hip, or abdominal surgeries to prevent blood clotting. It is also used to treat existing blood clots in the lungs or in the veins.  This medicine may be used for other purposes; ask your health care provider or pharmacist if you have questions.  COMMON BRAND NAME(S): Lovenox  What should I tell my health care provider before I take this medicine?  They need to know if you have any of these conditions:  bleeding disorders, hemorrhage, or hemophilia  infection of the heart or heart valves  kidney or liver disease  previous stroke  prosthetic heart valve  recent surgery or delivery of a baby  ulcer in the stomach or intestine, diverticulitis, or other bowel disease  an unusual or  allergic reaction to enoxaparin, heparin, pork or pork products, other medicines, foods, dyes, or preservatives  pregnant or trying to get pregnant  breast-feeding  How should I use this medicine?  This medicine is for injection under the skin. It is usually given by a health-care professional. You or a family member may be trained on how to give the injections. If you are to give yourself injections, make sure you understand how to use the syringe, measure the dose if necessary, and give the injection. To avoid bruising, do not rub the site where this medicine has been injected. Do not take your medicine more often than directed. Do not stop taking except on the advice of your doctor or health care professional.  Make sure you receive a puncture-resistant container to dispose of the needles and syringes once you have finished with them. Do not reuse these items. Return the container to your doctor or health care professional for proper disposal.  Talk to your pediatrician regarding the use of this medicine in children. Special care may be needed.  Overdosage: If you think you have taken too much of this medicine contact a poison control center or emergency room at once.  NOTE: This medicine is only for you. Do not share this medicine with others.  What if I miss a dose?  If you miss a dose, take it as soon as you can. If it is almost time for your next dose, take only that dose. Do not take double or extra doses.  What may interact with this medicine?  aspirin and aspirin-like medicines  certain medicines that treat or prevent blood clots  dipyridamole  NSAIDs, medicines for pain and inflammation, like ibuprofen or naproxen  This list may not describe all possible interactions. Give your health care provider a list of all the medicines, herbs, non-prescription drugs, or dietary supplements you use. Also tell them if you smoke, drink alcohol, or use illegal drugs. Some items may interact with your medicine.  What  should I watch for while using this medicine?  Visit your healthcare professional for regular checks on your progress. You may need blood work done while you are taking this medicine. Your condition will be monitored carefully while you are receiving this medicine. It is important not to miss any appointments.  If you are going to need surgery or other procedure, tell your healthcare professional that you are using this medicine.  Using this medicine for a long time may weaken your bones and increase the risk of bone fractures.  Avoid sports and activities that might cause injury while you are using this medicine. Severe falls or injuries can cause unseen bleeding. Be careful when using sharp tools or knives. Consider using an electric razor. Take special care brushing or flossing your teeth. Report any injuries, bruising, or red spots on the skin to your healthcare professional.  Wear a medical ID bracelet or chain. Carry a card that describes your disease and details of your medicine and dosage times.  What side effects may I notice from receiving this medicine?  Side effects that you should report to your doctor or health care professional as soon as possible:  allergic reactions like skin rash, itching or hives, swelling of the face, lips, or tongue  bone pain  signs and symptoms of bleeding such as bloody or black, tarry stools; red or dark-brown urine; spitting up blood or brown material that looks like coffee grounds; red spots on the skin; unusual bruising or bleeding from the eye, gums, or nose  signs and symptoms of a blood clot such as chest pain; shortness of breath; pain, swelling, or warmth in the leg  signs and symptoms of a stroke such as changes in vision; confusion; trouble speaking or understanding; severe headaches; sudden numbness or weakness of the face, arm or leg; trouble walking; dizziness; loss of coordination  Side effects that usually do not require medical attention (report to your  doctor or health care professional if they continue or are bothersome):  hair loss  pain, redness, or irritation at site where injected  This list may not describe all possible side effects. Call your doctor for medical advice about side effects. You may report side effects to FDA at 5-994-EPM-3556.  Where should I keep my medicine?  Keep out of the reach of children.  Store at room temperature between 15 and 30 degrees C (59 and 86 degrees F). Do not freeze. If your injections have been specially prepared, you may need to store them in the refrigerator. Ask your pharmacist. Throw away any unused medicine after the expiration date.  NOTE: This sheet is a summary. It may not cover all possible information. If you have questions about this medicine, talk to your doctor, pharmacist, or health care provider.  © 2020 Elsevier/Gold Standard (2018-12-13 11:25:34)    Is patient discharged on Warfarin / Coumadin?   No   Intramedullary Nailing of Hip Fracture, Care After  This sheet gives you information about how to care for yourself after your procedure. Your health care provider may also give you more specific instructions. If you have problems or questions, contact your health care provider.  What can I expect after the surgery?  After the procedure, it is common to have these symptoms in your hip area:  Pain.  Swelling.  Tenderness.  Stiffness and weakness.  Follow these instructions at home:  Medicines  Take over-the-counter and prescription medicines only as told by your health care provider.  Ask your health care provider if the medicine prescribed to you:  Requires you to avoid driving or using heavy machinery.  Can cause constipation. You may need to take actions to prevent or treat constipation, such as:  Drink enough fluid to keep your urine pale yellow.  Take over-the-counter or prescription medicines.  Eat foods that are high in fiber, such as beans, whole grains, and fresh fruits and vegetables.  Limit foods  that are high in fat and processed sugars, such as fried or sweet foods.  Bathing  Do not take baths, swim, or use a hot tub until your health care provider approves. Ask your health care provider if you may take showers. You may only be allowed to take sponge baths.  Keep your bandage (dressing) dry if you shower or take a sponge bath.  Incision care    Follow instructions from your health care provider about how to take care of your incision. Make sure you:  Wash your hands with soap and water before and after you change your dressing. If soap and water are not available, use hand .  Change your dressing as told by your health care provider.  Leave stitches (sutures), skin glue, or adhesive strips in place. These skin closures may need to stay in place for 2 weeks or longer. If adhesive strip edges start to loosen and curl up, you may trim the loose edges. Do not remove adhesive strips completely unless your health care provider tells you to do that.  Check your incision area every day for signs of infection. Check for:  More redness, swelling, or pain.  Fluid or blood.  Warmth.  Pus or a bad smell.  Managing pain, stiffness, and swelling    If directed, put ice on the affected area.  Put ice in a plastic bag.  Place a towel between your skin and the bag.  Leave the ice on for 20 minutes, 2-3 times a day.  Move your toes often to reduce stiffness and swelling.  Raise (elevate) the injured area above the level of your heart while you are lying down.  Activity  Rest as told by your health care provider.  Use your crutches or walker as told by your health care provider. Your health care provider will let you know how much weight you can put on your leg. Your health care provider will do X-rays to check bone healing. As healing progresses, you may be allowed to put more weight on your leg.  Avoid sitting for a long time without moving. Get up to take short walks every 1-2 hours. This is important to improve  blood flow and breathing. Ask for help if you feel weak or unsteady.  Keep all your physical therapy appointments.  Do exercises as told by your health care provider. These exercises will prevent weakness and stiffness in your hip.  Return to your normal activities as told by your health care provider. Ask your health care provider what activities are safe for you. It may take several months to heal completely.  Ask your health care provider when it is safe to drive.  General instructions  Do not use any products that contain nicotine or tobacco, such as cigarettes, e-cigarettes, and chewing tobacco. These can delay bone healing after surgery. If you need help quitting, ask your health care provider.  Take steps to prevent falls at home, such as removing throw rugs and tripping hazards.  Keep all follow-up visits as told by your health care provider. This is important.  Contact a health care provider if:  You are not getting relief from your pain medicine.  You have more redness, swelling, or pain around your incision.  You have fluid or blood coming from your incision.  Your incision feels warm to the touch.  You have pus or a bad smell coming from your incision.  Get help right away if:  You have a fever and chills.  You have chest pain or trouble breathing.  Your incision breaks open.  You have severe pain that does not get better with medicine.  Summary  After your surgery, it is normal to have some pain, swelling, and tenderness.  Take over-the-counter and prescription medicines only as told by your health care provider.  Follow instructions from your health care provider about how to take care of your incision. Check your incision area every day for signs of infection.  Return to your normal activities as told by your health care provider. Ask your health care provider what activities are safe for you.  It may take several months to heal completely. Keep all follow-up visits as told by your health care  provider.  This information is not intended to replace advice given to you by your health care provider. Make sure you discuss any questions you have with your health care provider.  Document Released: 10/21/2019 Document Revised: 10/21/2019 Document Reviewed: 10/21/2019  [employer] Patient Education © 2020 [employer] Inc.    Oxycodone tablets or capsules  What is this medicine?  OXYCODONE (ox i KOE done) is a pain reliever. It is used to treat moderate to severe pain.  This medicine may be used for other purposes; ask your health care provider or pharmacist if you have questions.  COMMON BRAND NAME(S): Dazidox, Endocodone, Oxaydo, OXECTA, OxyIR, Percolone, Roxicodone, Roxybond  What should I tell my health care provider before I take this medicine?  They need to know if you have any of these conditions:  Calvert's disease  brain tumor  head injury  heart disease  history of drug or alcohol abuse problem  if you often drink alcohol  kidney disease  liver disease  lung or breathing disease, like asthma  mental illness  pancreatic disease  seizures  thyroid disease  an unusual or allergic reaction to oxycodone, codeine, hydrocodone, morphine, other medicines, foods, dyes, or preservatives  pregnant or trying to get pregnant  breast-feeding  How should I use this medicine?  Take this medicine by mouth with a glass of water. Follow the directions on the prescription label. You can take it with or without food. If it upsets your stomach, take it with food. Take your medicine at regular intervals. Do not take it more often than directed. Do not stop taking except on your doctor's advice.  Some brands of this medicine, like Oxecta, have special instructions. Ask your doctor or pharmacist if these directions are for you: Do not cut, crush or chew this medicine. Swallow only one tablet at a time. Do not wet, soak, or lick the tablet before you take it.  A special MedGuide will be given to you by the pharmacist with each  prescription and refill. Be sure to read this information carefully each time.  Talk to your pediatrician regarding the use of this medicine in children. Special care may be needed.  Overdosage: If you think you have taken too much of this medicine contact a poison control center or emergency room at once.  NOTE: This medicine is only for you. Do not share this medicine with others.  What if I miss a dose?  If you miss a dose, take it as soon as you can. If it is almost time for your next dose, take only that dose. Do not take double or extra doses.  What may interact with this medicine?  This medicine may interact with the following medications:  alcohol  antihistamines for allergy, cough and cold  antiviral medicines for HIV or AIDS  atropine  certain antibiotics like clarithromycin, erythromycin, linezolid, rifampin  certain medicines for anxiety or sleep  certain medicines for bladder problems like oxybutynin, tolterodine  certain medicines for depression like amitriptyline, fluoxetine, sertraline  certain medicines for fungal infections like ketoconazole, itraconazole, voriconazole  certain medicines for migraine headache like almotriptan, eletriptan, frovatriptan, naratriptan, rizatriptan, sumatriptan, zolmitriptan  certain medicines for nausea or vomiting like dolasetron, ondansetron, palonosetron  certain medicines for Parkinson's disease like benztropine, trihexyphenidyl  certain medicines for seizures like phenobarbital, phenytoin, primidone  certain medicines for stomach problems like dicyclomine, hyoscyamine  certain medicines for travel sickness like scopolamine  diuretics  general anesthetics like halothane, isoflurane, methoxyflurane, propofol  ipratropium  local anesthetics like lidocaine, pramoxine, tetracaine  MAOIs like Carbex, Eldepryl, Marplan, Nardil, and Parnate  medicines that relax muscles for surgery  methylene blue  nilotinib  other narcotic medicines for pain or cough  phenothiazines like  chlorpromazine, mesoridazine, prochlorperazine, thioridazine  This list may not describe all possible interactions. Give your health care provider a list of all the medicines, herbs, non-prescription drugs, or dietary supplements you use. Also tell them if you smoke, drink alcohol, or use illegal drugs. Some items may interact with your medicine.  What should I watch for while using this medicine?  Tell your doctor or health care professional if your pain does not go away, if it gets worse, or if you have new or a different type of pain. You may develop tolerance to the medicine. Tolerance means that you will need a higher dose of the medicine for pain relief. Tolerance is normal and is expected if you take this medicine for a long time.  Do not suddenly stop taking your medicine because you may develop a severe reaction. Your body becomes used to the medicine. This does NOT mean you are addicted. Addiction is a behavior related to getting and using a drug for a non-medical reason. If you have pain, you have a medical reason to take pain medicine. Your doctor will tell you how much medicine to take. If your doctor wants you to stop the medicine, the dose will be slowly lowered over time to avoid any side effects.  There are different types of narcotic medicines (opiates). If you take more than one type at the same time or if you are taking another medicine that also causes drowsiness, you may have more side effects. Give your health care provider a list of all medicines you use. Your doctor will tell you how much medicine to take. Do not take more medicine than directed. Call emergency for help if you have problems breathing or unusual sleepiness.  You may get drowsy or dizzy. Do not drive, use machinery, or do anything that needs mental alertness until you know how the medicine affects you. Do not stand or sit up quickly, especially if you are an older patient. This reduces the risk of dizzy or fainting spells.  Alcohol may interfere with the effect of this medicine. Avoid alcoholic drinks.  This medicine will cause constipation. Try to have a bowel movement at least every 2 to 3 days. If you do not have a bowel movement for 3 days, call your doctor or health care professional.  Your mouth may get dry. Chewing sugarless gum or sucking hard candy, and drinking plenty of water may help. Contact your doctor if the problem does not go away or is severe.  What side effects may I notice from receiving this medicine?  Side effects that you should report to your doctor or health care professional as soon as possible:  allergic reactions like skin rash, itching or hives, swelling of the face, lips, or tongue  breathing problems  confusion  signs and symptoms of low blood pressure like dizziness; feeling faint or lightheaded, falls; unusually weak or tired  trouble passing urine or change in the amount of urine  trouble swallowing  Side effects that usually do not require medical attention (report to your doctor or health care professional if they continue or are bothersome):  constipation  dry mouth  nausea, vomiting  tiredness  This list may not describe all possible side effects. Call your doctor for medical advice about side effects. You may report side effects to FDA at 9-496-FDA-1823.  Where should I keep my medicine?  Keep out of the reach of children. This medicine can be abused. Keep your medicine in a safe place to protect it from theft. Do not share this medicine with anyone. Selling or giving away this medicine is dangerous and against the law.  Store at room temperature between 15 and 30 degrees C (59 and 86 degrees F). Protect from light. Keep container tightly closed.  This medicine may cause harm and death if it is taken by other adults, children, or pets. Return medicine that has not been used to an official disposal site. Contact the [employer] at 1-432.344.3469 or your [facility] government to find a site. If you cannot  return the medicine, flush it down the toilet. Do not use the medicine after the expiration date.  NOTE: This sheet is a summary. It may not cover all possible information. If you have questions about this medicine, talk to your doctor, pharmacist, or health care provider.  © 2020 Elsevier/Gold Standard (2018-04-24 16:13:10)    Depression / Suicide Risk    As you are discharged from this [facility] facility, it is important to learn how to keep safe from harming yourself.    Recognize the warning signs:  Abrupt changes in personality, positive or negative- including increase in energy   Giving away possessions  Change in eating patterns- significant weight changes-  positive or negative  Change in sleeping patterns- unable to sleep or sleeping all the time   Unwillingness or inability to communicate  Depression  Unusual sadness, discouragement and loneliness  Talk of wanting to die  Neglect of personal appearance   Rebelliousness- reckless behavior  Withdrawal from people/activities they love  Confusion- inability to concentrate     If you or a loved one observes any of these behaviors or has concerns about self-harm, here's what you can do:  Talk about it- your feelings and reasons for harming yourself  Remove any means that you might use to hurt yourself (examples: pills, rope, extension cords, firearm)  Get professional help from the community (Mental Health, Substance Abuse, psychological counseling)  Do not be alone:Call your Safe Contact- someone whom you trust who will be there for you.  Call your local CRISIS HOTLINE 751-5176 or 242-528-2844  Call your local Children's Mobile Crisis Response Team [place] (305) 816-8816 or www.Classkick  Call the toll free National Suicide Prevention Hotlines   National Suicide Prevention Lifeline 943-190-BFMK (0708)  National Hope Line Network 800-SUICIDE (612-2848)

## 2022-06-23 NOTE — DISCHARGE PLANNING
Choice for SNF received @ 0994. Referral sent to [facility] @8515 per choice form.     @6187  Agency/Facility Name: [facility]  Spoke To: Snehal   Outcome: Set up to go at @1300

## 2022-06-23 NOTE — CARE PLAN
The patient is Stable - Low risk of patient condition declining or worsening    Shift Goals  Clinical Goals: Pain control, Safety  Patient Goals: Pain  control, Comfort  Family Goals: Not present    Progress made toward(s) clinical / shift goals:    Problem: Pain - Standard  Goal: Alleviation of pain or a reduction in pain to the patient’s comfort goal  Outcome: Progressing  Note: Patient educated on pain scale and to notify RN of pain. Medicated per MAR and repositione d       Problem: Knowledge Deficit - Standard  Goal: Patient and family/care givers will demonstrate understanding of plan of care, disease process/condition, diagnostic tests and medications  Outcome: Progressing  Note: Plan of care discussed, verbalized understanding. Questions answered       Problem: Skin Integrity  Goal: Skin integrity is maintained or improved  Outcome: Progressing     Problem: Fall Risk  Goal: Patient will remain free from falls  Outcome: Progressing       Patient is not progressing towards the following goals:

## 2022-06-23 NOTE — THERAPY
Physical Therapy   Daily Treatment     Patient Name: Amirah Crane  Age:  86 y.o., Sex:  female  Medical Record #: [id number]  Today's Date: 6/23/2022     Precautions  Precautions: Fall Risk;Weight Bearing As Tolerated Right Lower Extremity    Assessment    Pt was pleasant and agreeable to therapy session. Continues to be limited due to pain in RLE and weakness at this time. She presents with delayed responses but improved overall mobility. She reached ModA for bed mobility with step by step cues for technique. Practiced multiple STS with and without fww requiring maxA at this time. Difficulty weight bearing onto right due to pain therefore leaning to the left. She presents with difficulty extending RLE therefore presents with trunk and knee flexion requiring maxA to stand at this time. She will require post acute therapy at this time. Will continue to follow while in house.     Plan    Continue current treatment plan.    DC Equipment Recommendations: Unable to determine at this time  Discharge Recommendations: Recommend post-acute placement for additional physical therapy services prior to discharge home       06/23/22 0849   Balance   Sitting Balance (Static) Fair -   Sitting Balance (Dynamic) Poor +   Standing Balance (Static) Trace +   Standing Balance (Dynamic) Trace   Weight Shift Sitting Poor   Weight Shift Standing Absent   Comments pt with left lateral lean to offload right hip due to pain, CGA to Wanda intermittently, vc to corect posture as she had tendency to lean anteriorly. Standing with maxA and assist for balance.   Gait Analysis   Gait Level Of Assist Unable to Participate   Bed Mobility    Supine to Sit Moderate Assist   Sit to Supine Maximal Assist   Scooting Maximal Assist   Skilled Intervention Verbal Cuing;Tactile Cuing;Sequencing   Comments extra time and step by step cues to assist with RLE And trunk to reach EOB   Functional Mobility   Sit to Stand Maximal Assist   Skilled Intervention  Verbal Cuing;Tactile Cuing;Compensatory Strategies   Comments perfromed 3x STS with fww maxA for lift off and performed 2 with HHA again still requiring maxA with seated rest breaks in between. With fatigue unable to extend RLE and keeping in knee flexion making up right posture difficult at this time.Lateral lean to the left due to pain in right le at this time.   Short Term Goals    Short Term Goal # 1 pt will be able to complete supine<>sitting with bed controls and min assist in 6tx in order to progress home   Goal Outcome # 1 goal not met   Short Term Goal # 2 pt will be able to complete functional transfers with FWW and min assist in 6tx in order to progress home   Goal Outcome # 2 Goal not met   Short Term Goal # 3 pt will be able to ambulate 50ft with FWW and min assist in 6tx in order to progress home   Goal Outcome # 3 Goal not met

## 2022-06-25 PROBLEM — D62 ACUTE BLOOD LOSS ANEMIA: Status: ACTIVE | Noted: 2022-06-21

## 2022-06-25 PROBLEM — N17.9 AKI (ACUTE KIDNEY INJURY) (HCC): Status: ACTIVE | Noted: 2022-06-25

## 2022-06-25 PROBLEM — S72.001S: Status: ACTIVE | Noted: 2022-06-20

## 2022-06-25 PROBLEM — R41.0 CONFUSION, POSTOPERATIVE: Status: ACTIVE | Noted: 2022-06-25

## 2022-07-05 PROBLEM — N39.0 ACUTE UTI (URINARY TRACT INFECTION): Status: ACTIVE | Noted: 2022-07-05

## 2022-07-12 PROBLEM — R05.9 COUGH: Status: ACTIVE | Noted: 2022-07-12

## 2022-09-07 ENCOUNTER — HOSPITAL ENCOUNTER (OUTPATIENT)
Facility: MEDICAL CENTER | Age: 87
End: 2022-09-07
Attending: PHYSICIAN ASSISTANT
Payer: MEDICARE

## 2022-09-07 PROCEDURE — 81001 URINALYSIS AUTO W/SCOPE: CPT

## 2022-09-08 LAB
APPEARANCE UR: ABNORMAL
BACTERIA #/AREA URNS HPF: ABNORMAL /HPF
BILIRUB UR QL STRIP.AUTO: NEGATIVE
COLOR UR: YELLOW
EPI CELLS #/AREA URNS HPF: NEGATIVE /HPF
GLUCOSE UR STRIP.AUTO-MCNC: NEGATIVE MG/DL
HYALINE CASTS #/AREA URNS LPF: ABNORMAL /LPF
KETONES UR STRIP.AUTO-MCNC: NEGATIVE MG/DL
LEUKOCYTE ESTERASE UR QL STRIP.AUTO: NEGATIVE
MICRO URNS: ABNORMAL
NITRITE UR QL STRIP.AUTO: NEGATIVE
PH UR STRIP.AUTO: 8.5 [PH] (ref 5–8)
PROT UR QL STRIP: 30 MG/DL
RBC # URNS HPF: ABNORMAL /HPF
RBC UR QL AUTO: NEGATIVE
SP GR UR STRIP.AUTO: 1.02
UROBILINOGEN UR STRIP.AUTO-MCNC: 0.2 MG/DL
WBC #/AREA URNS HPF: ABNORMAL /HPF

## 2022-09-29 PROBLEM — D22.9 NEVUS: Status: ACTIVE | Noted: 2022-09-29

## 2022-10-20 ENCOUNTER — APPOINTMENT (OUTPATIENT)
Dept: RADIOLOGY | Facility: MEDICAL CENTER | Age: 87
End: 2022-10-20
Attending: EMERGENCY MEDICINE
Payer: MEDICARE

## 2022-10-20 ENCOUNTER — HOSPITAL ENCOUNTER (EMERGENCY)
Facility: MEDICAL CENTER | Age: 87
End: 2022-10-20
Attending: EMERGENCY MEDICINE
Payer: MEDICARE

## 2022-10-20 VITALS
BODY MASS INDEX: 17.68 KG/M2 | DIASTOLIC BLOOD PRESSURE: 55 MMHG | SYSTOLIC BLOOD PRESSURE: 119 MMHG | HEART RATE: 64 BPM | WEIGHT: 103 LBS | RESPIRATION RATE: 16 BRPM | TEMPERATURE: 97.4 F | OXYGEN SATURATION: 95 %

## 2022-10-20 DIAGNOSIS — S00.83XA CONTUSION OF FOREHEAD, INITIAL ENCOUNTER: ICD-10-CM
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
## 2022-10-20 PROCEDURE — 99284 EMERGENCY DEPT VISIT MOD MDM: CPT | Mod: 25

## 2022-10-20 PROCEDURE — 70450 CT HEAD/BRAIN W/O DYE: CPT

## 2022-10-20 ASSESSMENT — FIBROSIS 4 INDEX: FIB4 SCORE: 2.88

## 2022-10-21 NOTE — ED PROVIDER NOTES
ED Provider Note    CHIEF COMPLAINT  Chief Complaint   Patient presents with    T-5000 GLF         HPI  Amirah Crane is a 87 y.o. female who presents following a mechanical ground level fall earlier today this morning.  Lives at an assisted living facility and she was sent here for further evaluation.  She has a large bruise to the left forehead.  Denies loss of consciousness.  She states that she simply tripped and fell.  She typically gets around with a walker.  Denies neck or back pain.  Denies extremity pain.  No vomiting.  Does not take chronic anticoagulation.    REVIEW OF SYSTEMS  See HPI for further details. All other systems are negative.     PAST MEDICAL HISTORY   has a past medical history of GERD (gastroesophageal reflux disease).    SOCIAL HISTORY  Social History     Tobacco Use    Smoking status: Never    Smokeless tobacco: Never   Substance and Sexual Activity    Alcohol use: Not on file    Drug use: Not on file    Sexual activity: Not on file       SURGICAL HISTORY   has a past surgical history that includes femur nailing intramedullary (Right, 6/20/2022).    CURRENT MEDICATIONS  Home Medications    **Home medications have not yet been reviewed for this encounter**         ALLERGIES  Allergies   Allergen Reactions    Pcn [Penicillins] Rash     Full body     Many years ago per family    Pseudoephedrine      Other reaction(s): LEGS JERK       PHYSICAL EXAM  VITAL SIGNS: /55   Pulse 64   Temp 36.3 °C (97.4 °F) (Temporal)   Resp 16   Wt 46.7 kg (103 lb)   SpO2 95%   BMI 17.68 kg/m²   Pulse ox interpretation: I interpret this pulse ox as normal.  Constitutional: Alert in no apparent distress.  HENT: Large left forehead contusion, Bilateral external ears normal, Nose normal.   Eyes: Pupils are equal and reactive, Conjunctiva normal, Non-icteric.   Neck: Normal range of motion, No tenderness, Supple, No stridor.   Cardiovascular: Regular rate and rhythm.   Thorax & Lungs: Normal breath  sounds, No respiratory distress  Abdomen: Bowel sounds normal, Soft, No tenderness, No masses  Skin: Warm, Dry, No erythema, No rash.   Extremities: Intact distal pulses, No edema, No tenderness  Musculoskeletal: Good range of motion in all major joints. No major deformities noted.   Neurologic: Alert, No focal deficits noted.       DIAGNOSTIC STUDIES / PROCEDURES    LABS  Labs Reviewed - No data to display      RADIOLOGY  CT-HEAD W/O   Final Result      1.  There is no acute intracranial hemorrhage or displaced calvarial fracture.      2.  White matter lucencies most consistent with small vessel ischemic change versus demyelination or gliosis.      3.  There is cerebral atrophy.      4. There is focal scalp swelling in the left frontal region.               COURSE & MEDICAL DECISION MAKING    Medications - No data to display    Pertinent Labs & Imaging studies reviewed. (See chart for details)  87 y.o. female presenting from an assisted living facility following a mechanical ground-level fall earlier today.  No vomiting.  No significant headache.  No neck pain.  Does have obvious signs of trauma to the head including a large contusion with swelling and ecchymosis to the left forehead.  No chronic anticoagulation though did recently take some anticoagulation following a recent right hip surgery for prophylaxis which she stopped a few weeks ago.    CT head was performed.  Cannot confidently clinically clear the patient's head injury given her age.  CT head did not show any signs of acute traumatic injury.  Patient is resting comfortably upon reevaluation.  Appears stable for discharge.    The patient was instructed to follow-up with primary care physician for further management.  To return immediately for any worsening symptoms or development of any other concerning signs or symptoms. The patient verbalizes understanding in their own words.    /55   Pulse 64   Temp 36.3 °C (97.4 °F) (Temporal)   Resp 16    Wt 46.7 kg (103 lb)   SpO2 95%   BMI 17.68 kg/m²     The patient was referred to primary care where they will receive further BP management.      [facility], Emergency Dept  [address] [address] [phone]    As needed, If symptoms worsen    Primary care doctor    Schedule an appointment as soon as possible for a visit       FINAL IMPRESSION  1. Contusion of forehead, initial encounter    2. Fall, initial encounter            Electronically signed by: Sridhar Valles M.D., 10/20/2022 6:10 PM

## 2022-10-21 NOTE — ED TRIAGE NOTES
Pt BIB [facility] with c/c of GLF this morning. Pt stating approx 10-12 hours ago she was ambulating to the restroom with her walker when she tripped and hit her head on the floor. Pt stating she went about her day, went out to lunch, etc then returned back to her residence, [facility], where they wanted her to be checked out. Pt with no complaints. Pt does have a hematoma/swelling over her L eye.

## 2022-10-21 NOTE — ED NOTES
Pt states understanding of dc instructions and f/u care. Daughter here for transport home. Pt assisted out by staff in .

## 2022-11-07 ENCOUNTER — PATIENT MESSAGE (OUTPATIENT)
Dept: HEALTH INFORMATION MANAGEMENT | Facility: OTHER | Age: 87
End: 2022-11-07

## 2022-12-18 ENCOUNTER — OFFICE VISIT (OUTPATIENT)
Dept: URGENT CARE | Facility: CLINIC | Age: 87
End: 2022-12-18
Payer: MEDICARE

## 2022-12-18 VITALS
TEMPERATURE: 97.8 F | SYSTOLIC BLOOD PRESSURE: 130 MMHG | BODY MASS INDEX: 18.61 KG/M2 | OXYGEN SATURATION: 97 % | RESPIRATION RATE: 14 BRPM | WEIGHT: 109 LBS | HEIGHT: 64 IN | DIASTOLIC BLOOD PRESSURE: 78 MMHG | HEART RATE: 63 BPM

## 2022-12-18 DIAGNOSIS — S01.312A LACERATION OF LEFT PINNA, INITIAL ENCOUNTER: ICD-10-CM

## 2022-12-18 PROCEDURE — 12013 RPR F/E/E/N/L/M 2.6-5.0 CM: CPT | Performed by: FAMILY MEDICINE

## 2022-12-18 ASSESSMENT — FIBROSIS 4 INDEX: FIB4 SCORE: 2.88

## 2022-12-25 ASSESSMENT — ENCOUNTER SYMPTOMS
FOCAL WEAKNESS: 0
CHILLS: 0
FEVER: 0
SENSORY CHANGE: 0

## 2022-12-25 NOTE — PROGRESS NOTES
"Subjective     Nancy Crane is a 87 y.o. female who presents with Laceration (Left ear, pt ran into table, happened today )            Onset just prior to arrival left ear laceration.  She struck her ear on a table due to mechanical GLF.  No headache.  No loss of consciousness.  No vision change.  No neck pain.  No headache.  Bleeding controlled direct pressure.  No other aggravating alleviating factors.      Review of Systems   Constitutional:  Negative for chills and fever.   Neurological:  Negative for sensory change and focal weakness.            Objective     /78 (BP Location: Left arm, Patient Position: Sitting, BP Cuff Size: Adult)   Pulse 63   Temp 36.6 °C (97.8 °F) (Temporal)   Resp 14   Ht 1.626 m (5' 4\")   Wt 49.4 kg (109 lb)   SpO2 97%   BMI 18.71 kg/m²      Physical Exam  Constitutional:       Appearance: Normal appearance.   HENT:      Head: Normocephalic.     Skin:     General: Skin is warm and dry.      Findings: No rash.   Neurological:      Mental Status: She is alert.           Procedure: Laceration Repair  -Risks including bleeding, nerve damage, infection, and poor cosmetic outcome discussed. Benefits and alternatives discussed.   -Clean technique with sterile instruments and suture used  -Local anesthesia with 1% lidocaine  -Closed with #4 5-0 vicryl interrupted sutures with good wound approximation  -Polysporin and dressing placed  -Patient tolerated well                    Assessment & Plan         1. Laceration of left pinna, initial encounter          Differential diagnosis, natural history, supportive care, and indications for immediate follow-up discussed at length.     Wound care. F/U as needed.         "

## 2023-03-27 PROBLEM — R41.0 CONFUSION, POSTOPERATIVE: Status: RESOLVED | Noted: 2022-06-25 | Resolved: 2023-03-27

## 2023-03-27 PROBLEM — N39.0 ACUTE UTI (URINARY TRACT INFECTION): Status: RESOLVED | Noted: 2022-07-05 | Resolved: 2023-03-27

## 2023-03-27 PROBLEM — R53.81 PHYSICAL DECONDITIONING: Status: RESOLVED | Noted: 2021-06-21 | Resolved: 2023-03-27

## 2023-03-27 PROBLEM — Z71.89 ADVANCED CARE PLANNING/COUNSELING DISCUSSION: Status: RESOLVED | Noted: 2022-06-20 | Resolved: 2023-03-27

## 2023-03-27 PROBLEM — N17.9 AKI (ACUTE KIDNEY INJURY) (HCC): Status: RESOLVED | Noted: 2022-06-25 | Resolved: 2023-03-27

## 2023-03-27 PROBLEM — R00.2 PALPITATION: Status: RESOLVED | Noted: 2022-01-21 | Resolved: 2023-03-27

## 2023-03-27 PROBLEM — R23.2 HOT FLASHES: Status: RESOLVED | Noted: 2021-06-21 | Resolved: 2023-03-27

## 2023-03-27 PROBLEM — R05.9 COUGH: Status: RESOLVED | Noted: 2022-07-12 | Resolved: 2023-03-27

## 2023-03-27 PROBLEM — R25.2 LEG CRAMPS: Status: RESOLVED | Noted: 2021-10-29 | Resolved: 2023-03-27

## 2023-03-27 PROBLEM — D62 ACUTE BLOOD LOSS ANEMIA: Status: RESOLVED | Noted: 2022-06-21 | Resolved: 2023-03-27

## 2023-04-10 ENCOUNTER — HOSPITAL ENCOUNTER (EMERGENCY)
Facility: MEDICAL CENTER | Age: 88
End: 2023-04-10
Attending: EMERGENCY MEDICINE
Payer: MEDICARE

## 2023-04-10 ENCOUNTER — APPOINTMENT (OUTPATIENT)
Dept: RADIOLOGY | Facility: MEDICAL CENTER | Age: 88
End: 2023-04-10
Attending: EMERGENCY MEDICINE
Payer: MEDICARE

## 2023-04-10 VITALS
TEMPERATURE: 97.1 F | HEIGHT: 65 IN | DIASTOLIC BLOOD PRESSURE: 94 MMHG | SYSTOLIC BLOOD PRESSURE: 164 MMHG | RESPIRATION RATE: 18 BRPM | WEIGHT: 110 LBS | BODY MASS INDEX: 18.33 KG/M2 | HEART RATE: 98 BPM | OXYGEN SATURATION: 95 %

## 2023-04-10 DIAGNOSIS — M25.561 ACUTE PAIN OF RIGHT KNEE: ICD-10-CM

## 2023-04-10 PROCEDURE — 73562 X-RAY EXAM OF KNEE 3: CPT | Mod: RT

## 2023-04-10 PROCEDURE — 99284 EMERGENCY DEPT VISIT MOD MDM: CPT

## 2023-04-10 ASSESSMENT — FIBROSIS 4 INDEX: FIB4 SCORE: 2.88

## 2023-04-10 ASSESSMENT — LIFESTYLE VARIABLES: DO YOU DRINK ALCOHOL: NO

## 2023-04-10 NOTE — DISCHARGE INSTRUCTIONS
Please see your primary care provider in follow-up who may need to refer you to an orthopedic surgeon and use the brace as indicated    Take Celebrex and Tylenol for pain control

## 2023-04-10 NOTE — ED TRIAGE NOTES
"Chief Complaint   Patient presents with    Knee Pain     R sided x1 week. Pt denies any associated falls but believes she twisted her knee.      /82   Pulse 83   Temp 35.9 °C (96.6 °F) (Temporal)   Resp 16   Ht 1.651 m (5' 5\")   Wt 49.9 kg (110 lb)   SpO2 95%   BMI 18.30 kg/m²     Pt by wheelchair to triage for above, given 1g of Tylenol by EMS PTA.   "
Comment: Seen over TD. Acne vulgaris, moderate, inflammatory; nature and etiology discussed as hormonal and genetic. Notes that her previous treatment with clindamycin in the am, Epiduo forte at night stopped working. Thus, Will begin treatment with doxycycline 100mg PO QD x3 months. Side effects reviewed. Will begin topical treatment with Benzaclin gel QAM and tretinoin 0.025% cream QHS as tolerated. Application instructions and potential side effects reviewed. Caution irritation. Follow up in 3 months.
Detail Level: Simple
Render Risk Assessment In Note?: yes

## 2023-04-10 NOTE — ED PROVIDER NOTES
ED Provider Note    CHIEF COMPLAINT  Chief Complaint   Patient presents with    Knee Pain     R sided x1 week. Pt denies any associated falls but believes she twisted her knee.        EXTERNAL RECORDS REVIEWED  Outpatient Notes reviewed outpatient note on February 15 regarding recurrent UTIs    HPI/ROS  LIMITATION TO HISTORY   Select: : None      Amirah Crane is a 87 y.o. female who uses a walker on a regular basis for ambulatory ambulation and states that 2 weeks ago she fell out of a chair but did not hear her right knee directly on the ground but says that she thinks she hurt her knee and feels less stable with more pain since then.  She thinks she had another injury about a week ago of the same knee and says she has had a harder time ambulating even with a walker    PAST MEDICAL HISTORY   has a past medical history of Acute blood loss anemia (6/21/2022), Acute UTI (urinary tract infection) (7/5/2022), Advanced care planning/counseling discussion (6/20/2022), SALIMA (acute kidney injury) (HCC) (6/25/2022), Confusion, postoperative (6/25/2022), Cough (7/12/2022), GERD (gastroesophageal reflux disease), Hot flashes (6/21/2021), Palpitation (1/21/2022), and Physical deconditioning (6/21/2021).    SURGICAL HISTORY   has a past surgical history that includes femur nailing intramedullary (Right, 6/20/2022).    FAMILY HISTORY  History reviewed. No pertinent family history.    SOCIAL HISTORY  Social History     Tobacco Use    Smoking status: Never    Smokeless tobacco: Never   Substance and Sexual Activity    Alcohol use: Not Currently    Drug use: Never    Sexual activity: Not on file       CURRENT MEDICATIONS  Home Medications       Reviewed by Melba Bradley R.N. (Registered Nurse) on 04/10/23 at 1124  Med List Status: Partial     Medication Last Dose Status   acetaminophen (TYLENOL) 650 MG CR tablet  Active   celecoxib (CELEBREX) 100 MG Cap  Active   cephALEXin (KEFLEX) 250 MG Cap  Active   Cholecalciferol  "(VITAMIN D) 2000 UNIT Tab  Active   diclofenac sodium (VOLTAREN) 1 % Gel  Active   fluoxetine (PROZAC) 40 MG capsule  Active   fluticasone (FLONASE SENSIMIST) 27.5 MCG/SPRAY nasal spray  Active   magnesium hydroxide (MILK OF MAGNESIA) 400 MG/5ML Suspension  Active   magnesium oxide (MAG-OX) 400 MG Tab tablet  Active   metoprolol SR (TOPROL XL) 25 MG TABLET SR 24 HR  Active   mirtazapine (REMERON) 7.5 MG tablet  Active   ondansetron (ZOFRAN ODT) 4 MG TABLET DISPERSIBLE  Active   pantoprazole (PROTONIX) 40 MG Tablet Delayed Response  Active   polyethylene glycol 3350 (MIRALAX) 17 GM/SCOOP Powder  Active   Vibegron 75 MG Tab  Active                    ALLERGIES  Allergies   Allergen Reactions    Pcn [Penicillins] Rash     Full body     Many years ago per family    Pseudoephedrine      Other reaction(s): LEGS JERK       PHYSICAL EXAM  VITAL SIGNS: /82   Pulse 83   Temp 35.9 °C (96.6 °F) (Temporal)   Resp 16   Ht 1.651 m (5' 5\")   Wt 49.9 kg (110 lb)   SpO2 95%   BMI 18.30 kg/m²    Limited examination of the right lower extremity reveals a right knee that is mildly swollen with the mild erythema but no significant warmth.  She has passive full range of motion but actively she is limited by pain.  Good valgus and varus stress endpoints and negative Lachman's and anterior drawer test.  The limb is neurovascularly intact.  Neurologically she is mentating well and has no focal deficits    DIAGNOSTIC STUDIES / PROCEDURES      RADIOLOGY  I have independently interpreted the diagnostic imaging associated with this visit and am waiting the final reading from the radiologist.   My preliminary interpretation is as follows: No fracture  Radiologist interpretation:   DX-KNEE 3 VIEWS RIGHT   Final Result      1.  No fracture or dislocation of RIGHT knee.   2.  Moderate osteoarthritis.   3.  Probable joint bodies present.   4.  Diffuse osteopenia.   5.  Mild soft tissue swelling.            COURSE & MEDICAL DECISION " MAKING    ED Observation Status? No; Patient does not meet criteria for ED Observation.     INITIAL ASSESSMENT, COURSE AND PLAN  Care Narrative: The patient had an accidental fall 2 weeks ago and then another one about a week ago and she has had right knee discomfort ever since.  She says that it feels less stable than usual and she has been having difficult time using her walker given the amount of pain.     X-ray was performed that shows no fracture or dislocation of the right knee but there is moderate osteoarthritis and probable joint bodies present with some mild soft tissue swelling    ADDITIONAL PROBLEM LIST  Difficulty with ambulation.  Uses a walker.  With knee brace we ambulated her    DISPOSITION AND DISCUSSIONS    Escalation of care considered, and ultimately not performed:blood analysis did feel like this was a consideration but not indicated given her timeline and presentation    Decision tools and prescription drugs considered including, but not limited to: Pain Medications at this time and will stick with OTC medications for pain control .  Continue Celebrex once daily and add Tylenol as needed    FINAL DIAGNOSIS  1. Acute pain of right knee           Electronically signed by: Rey Hernandez M.D., 4/10/2023 12:42 PM

## 2023-04-10 NOTE — ED NOTES
Reviewed discharge instructions, pt verbalized understanding of instructions and medication. States she will schedule follow-up appointment. Denies further questions at this time. Pt out of ED via personal walker.

## 2023-04-13 ENCOUNTER — HOSPITAL ENCOUNTER (OUTPATIENT)
Facility: MEDICAL CENTER | Age: 88
End: 2023-04-13
Attending: PHYSICIAN ASSISTANT
Payer: MEDICARE

## 2023-04-13 PROBLEM — M25.561 ACUTE PAIN OF RIGHT KNEE: Status: ACTIVE | Noted: 2023-04-13

## 2023-04-13 PROCEDURE — 87086 URINE CULTURE/COLONY COUNT: CPT

## 2023-04-13 PROCEDURE — 87077 CULTURE AEROBIC IDENTIFY: CPT | Mod: 91

## 2023-04-16 LAB
BACTERIA UR CULT: NORMAL
SIGNIFICANT IND 70042: NORMAL
SITE SITE: NORMAL
SOURCE SOURCE: NORMAL

## 2023-05-18 PROBLEM — B00.9 HERPES: Status: ACTIVE | Noted: 2023-05-18

## 2023-11-29 ENCOUNTER — PATIENT MESSAGE (OUTPATIENT)
Dept: HEALTH INFORMATION MANAGEMENT | Facility: OTHER | Age: 88
End: 2023-11-29

## (undated) DEVICE — PENCIL ELECTSURG 10FT BTN SWH - (50/CA)

## (undated) DEVICE — SUTURE GENERAL

## (undated) DEVICE — SLEEVE, VASO, THIGH, MED

## (undated) DEVICE — TOWELS CLOTH SURGICAL - (4/PK 20PK/CA)

## (undated) DEVICE — ELECTRODE 850 FOAM ADHESIVE - HYDROGEL RADIOTRNSPRNT (50/PK)

## (undated) DEVICE — CANISTER SUCTION 3000ML MECHANICAL FILTER AUTO SHUTOFF MEDI-VAC NONSTERILE LF DISP  (40EA/CA)

## (undated) DEVICE — ELECTRODE DUAL RETURN W/ CORD - (50/PK)

## (undated) DEVICE — BAG SPONGE COUNT 10.25 X 32 - BLUE (250/CA)

## (undated) DEVICE — DRAPE LARGE 3 QUARTER - (20/CA)

## (undated) DEVICE — TUBE CONNECT SUCTION CLEAR 120 X 1/4" (50EA/CA)"

## (undated) DEVICE — DRAPE SURGICAL U 77X120 - (10/CA)

## (undated) DEVICE — PROTECTOR ULNA NERVE - (36PR/CA)

## (undated) DEVICE — CLEANER ELECTRO-SURGICAL TIP - (25/BX 4BX/CA)

## (undated) DEVICE — STAPLER SKIN DISP - (6/BX 10BX/CA) VISISTAT

## (undated) DEVICE — SUCTION INSTRUMENT YANKAUER OPEN TIP W/O VENT (50EA/CA)

## (undated) DEVICE — GLOVE BIOGEL SZ 7.5 SURGICAL PF LTX - (50PR/BX 4BX/CA)

## (undated) DEVICE — GUIDE PIN CALIBRATED (5EA/PK) (4TX6=24)

## (undated) DEVICE — LACTATED RINGERS INJ 1000 ML - (14EA/CA 60CA/PF)

## (undated) DEVICE — SUCTION INSTRUMENT YANKAUER BULBOUS TIP W/O VENT (50EA/CA)

## (undated) DEVICE — TOWEL STOP TIMEOUT SAFETY FLAG (40EA/CA)

## (undated) DEVICE — GLOVE BIOGEL INDICATOR SZ 8 SURGICAL PF LTX - (50/BX 4BX/CA)

## (undated) DEVICE — DRESSING TRANSPARENT FILM TEGADERM 4 X 4.75" (50EA/BX)"

## (undated) DEVICE — BIT DRILL LONG CALIBRATED 4.2MM X 330MM (4TX2=8)

## (undated) DEVICE — SET LEADWIRE 5 LEAD BEDSIDE DISPOSABLE ECG (1SET OF 5/EA)

## (undated) DEVICE — NEPTUNE 4 PORT MANIFOLD - (20/PK)

## (undated) DEVICE — DRAPE SURG STERI-DRAPE 7X11OD - (40EA/CA)

## (undated) DEVICE — SET EXTENSION WITH 2 PORTS (48EA/CA) ***PART #2C8610 IS A SUBSTITUTE*****

## (undated) DEVICE — DRAPE 36X28IN RAD CARM BND BG - (25/CA) O

## (undated) DEVICE — SENSOR OXIMETER ADULT SPO2 RD SET (20EA/BX)

## (undated) DEVICE — TUBING CLEARLINK DUO-VENT - C-FLO (48EA/CA)

## (undated) DEVICE — HEAD HOLDER JUNIOR/ADULT

## (undated) DEVICE — GOWN WARMING STANDARD FLEX - (30/CA)

## (undated) DEVICE — KIT ANESTHESIA W/CIRCUIT & 3/LT BAG W/FILTER (20EA/CA)